# Patient Record
Sex: FEMALE | Race: WHITE | NOT HISPANIC OR LATINO | Employment: FULL TIME | URBAN - METROPOLITAN AREA
[De-identification: names, ages, dates, MRNs, and addresses within clinical notes are randomized per-mention and may not be internally consistent; named-entity substitution may affect disease eponyms.]

---

## 2023-11-14 DIAGNOSIS — Z76.0 PRESCRIPTION REFILL: ICD-10-CM

## 2023-11-14 RX ORDER — LEVOTHYROXINE SODIUM 125 UG/1
TABLET ORAL
Qty: 90 TABLET | Refills: 0 | Status: SHIPPED | OUTPATIENT
Start: 2023-11-14 | End: 2024-02-20

## 2024-01-05 ENCOUNTER — HOSPITAL ENCOUNTER (OUTPATIENT)
Dept: RADIOLOGY | Facility: EXTERNAL LOCATION | Age: 44
Discharge: HOME | End: 2024-01-05

## 2024-01-05 DIAGNOSIS — S59.901A ELBOW INJURY, RIGHT, INITIAL ENCOUNTER: ICD-10-CM

## 2024-01-21 NOTE — PROGRESS NOTES
This is a 43 year old female for FU VISIT and care gap update and arrives with ACUTE URI Sxs and NAUSEA seen AT ER 3 days ago and at that time NEG for COVID    SHE IS ONLY VAX x 2 initial NO BOOSTERS    HAS HAD THREE EPISODES of COVID over the years.    NO THOUGHTS of SELF or OTHER HARM    Has Hx DEPRESSION          [facility] ER NOTES:       ED Provider Note  Patient Name: Catalina Meyers                                                        MRN: [id number]  : 1980  SERVICE DATE: 24        History      Patient presents with:  Flu Like Symptoms        43-year-old female with history of hypothyroidism presented ER for evaluation of not feeling well for the last 3 days.  Endorses general malaise and fatigue as well as some nausea.  Reports she was constipated but this is since resolved.  He would like she is urinating frequently without dysuria.  Denies sore throat or new cough.  States she has chronic cough in the morning secondary to smoking.  She has had some shortness of breath without chest pain.  Notes her partner at bedside has had a cold recently but no other known sick contacts.  She was febrile when she arrived here but had not really noticed any fevers or chills at home.          ROS is NEG for HEADACHE, NAUSEA, VOMITING, DIARRHEA, CHEST PAIN, SOB, and BLEEDING and as further REVIEWED BELOW.    Subjective   Catalina Meyers is a 43 y.o. female who presents for FU VISIT.    HPI:    See above    Review of systems is essentially negative for all systems except for any identified issues in HPI above    Objective     /62   Pulse 89   Temp 35.7 °C (96.2 °F) (Temporal)   Wt 48.5 kg (107 lb)   SpO2 98%   BMI 17.01 kg/m²      Physical Examination:       GENERAL           General Appearance: well-appearing, well-developed, well-hydrated, well-nourished, no acute distress.        HEENT           NECK supple, no masses or thyromegaly, no carotid bruit.        EYES           Extraocular Movements: normal,  bilateral eyes EMILIA, BUT POS FOR LEFT LATERAL CONJUNCTIVAL injection        HEART           Rate and Rhythm regular rate and rhythm. Heart sounds: normal S1S2, no S3 or S4. Murmurs: none.        CHEST           Breath sounds: Clear to IPPA, RR<16 no use of accessory muscles.        ABDOMEN           General: Neg for LKKS or masses, no scleral icterus or jaundice.        MUSCULOSKELETAL           Joints Demonstration: Neg for erythema, swelling or joint deformities. gross abnormalities no gross abnormalities.        EXTREMITIES           Lower Extremities: Neg for cyanosis, clubbing or edema.       Assessment/Plan   START A 5 day HOME QUARANTINE for VIRAL ILLNESS as we seek to again RULE OUT RSV and COVID and FLU and DUE TO MARKED WEIGHT LOSS and NAUSEA and FEVER and ABDO PAIN, we are ruling out SUBACUTE abdominal/pelvic issues toay with a CT SCAN    REST  FLUIDS with PEDIALYTE  TYLENOL for aches and pains    REMAIN OFF WORK until Monday next week and schedule a FU visit with me later this week for us to work on your [employer] paperwork. Please adv your employer with this note that I have you off work from today until one week from now pending further work up and once we have completed your evaluation (imaging and labs) we will complete their requested [employer] PAPERWORK>          Problem List Items Addressed This Visit    None  Visit Diagnoses       Screening for colorectal cancer    -  Primary    Health counseling        Immunization counseling        Acute upper respiratory infection        Relevant Orders    Sars-CoV-2 PCR, Symptomatic    RSV PCR    Influenza A, and B PCR    CBC and Auto Differential    Basic metabolic panel    Urinalysis with Reflex Culture and Microscopic    Nausea        Relevant Orders    Amylase    Lipase    Hepatic function panel    Malaise        Relevant Orders    Amylase    Lipase    Hepatic function panel    Mononucleosis screen    Collins-Barr virus VCA antibody panel    Other fatigue         Relevant Orders    Mononucleosis screen    Collins-Barr virus VCA antibody panel    Screening mammogram for breast cancer        Relevant Orders    BI mammo bilateral screening tomosynthesis            FOLLOW UP:  PRN and as specified above         Meli Burks M.D.

## 2024-01-22 ENCOUNTER — OFFICE VISIT (OUTPATIENT)
Dept: PRIMARY CARE | Facility: CLINIC | Age: 44
End: 2024-01-22
Payer: COMMERCIAL

## 2024-01-22 ENCOUNTER — HOSPITAL ENCOUNTER (OUTPATIENT)
Dept: RADIOLOGY | Facility: HOSPITAL | Age: 44
Discharge: HOME | End: 2024-01-22
Payer: COMMERCIAL

## 2024-01-22 VITALS
OXYGEN SATURATION: 98 % | TEMPERATURE: 96.2 F | DIASTOLIC BLOOD PRESSURE: 62 MMHG | HEART RATE: 89 BPM | BODY MASS INDEX: 17.01 KG/M2 | SYSTOLIC BLOOD PRESSURE: 128 MMHG | WEIGHT: 107 LBS

## 2024-01-22 DIAGNOSIS — Z09 HOSPITAL DISCHARGE FOLLOW-UP: ICD-10-CM

## 2024-01-22 DIAGNOSIS — Z12.31 SCREENING MAMMOGRAM FOR BREAST CANCER: ICD-10-CM

## 2024-01-22 DIAGNOSIS — R11.0 NAUSEA: ICD-10-CM

## 2024-01-22 DIAGNOSIS — R53.81 MALAISE: ICD-10-CM

## 2024-01-22 DIAGNOSIS — R63.4 WEIGHT LOSS: ICD-10-CM

## 2024-01-22 DIAGNOSIS — R10.30 LOWER ABDOMINAL PAIN: Primary | ICD-10-CM

## 2024-01-22 DIAGNOSIS — Z71.85 IMMUNIZATION COUNSELING: ICD-10-CM

## 2024-01-22 DIAGNOSIS — R53.83 OTHER FATIGUE: ICD-10-CM

## 2024-01-22 DIAGNOSIS — Z12.11 SCREENING FOR COLORECTAL CANCER: ICD-10-CM

## 2024-01-22 DIAGNOSIS — J06.9 ACUTE UPPER RESPIRATORY INFECTION: ICD-10-CM

## 2024-01-22 DIAGNOSIS — Z12.12 SCREENING FOR COLORECTAL CANCER: ICD-10-CM

## 2024-01-22 DIAGNOSIS — Z71.9 HEALTH COUNSELING: ICD-10-CM

## 2024-01-22 DIAGNOSIS — R10.30 LOWER ABDOMINAL PAIN: ICD-10-CM

## 2024-01-22 PROCEDURE — 82248 BILIRUBIN DIRECT: CPT

## 2024-01-22 PROCEDURE — 83690 ASSAY OF LIPASE: CPT

## 2024-01-22 PROCEDURE — 87635 SARS-COV-2 COVID-19 AMP PRB: CPT | Mod: WESLAB | Performed by: FAMILY MEDICINE

## 2024-01-22 PROCEDURE — 81001 URINALYSIS AUTO W/SCOPE: CPT

## 2024-01-22 PROCEDURE — 85025 COMPLETE CBC W/AUTO DIFF WBC: CPT

## 2024-01-22 PROCEDURE — 86665 EPSTEIN-BARR CAPSID VCA: CPT

## 2024-01-22 PROCEDURE — 80053 COMPREHEN METABOLIC PANEL: CPT

## 2024-01-22 PROCEDURE — 74177 CT ABD & PELVIS W/CONTRAST: CPT

## 2024-01-22 PROCEDURE — 86308 HETEROPHILE ANTIBODY SCREEN: CPT

## 2024-01-22 PROCEDURE — 86664 EPSTEIN-BARR NUCLEAR ANTIGEN: CPT

## 2024-01-22 PROCEDURE — 99214 OFFICE O/P EST MOD 30 MIN: CPT | Mod: 25 | Performed by: FAMILY MEDICINE

## 2024-01-22 PROCEDURE — 2550000001 HC RX 255 CONTRASTS: Performed by: FAMILY MEDICINE

## 2024-01-22 PROCEDURE — 86663 EPSTEIN-BARR ANTIBODY: CPT

## 2024-01-22 PROCEDURE — 99214 OFFICE O/P EST MOD 30 MIN: CPT | Performed by: FAMILY MEDICINE

## 2024-01-22 PROCEDURE — 1036F TOBACCO NON-USER: CPT | Performed by: FAMILY MEDICINE

## 2024-01-22 PROCEDURE — 82150 ASSAY OF AMYLASE: CPT

## 2024-01-22 RX ADMIN — IOHEXOL 75 ML: 350 INJECTION, SOLUTION INTRAVENOUS at 13:41

## 2024-01-22 ASSESSMENT — PAIN SCALES - GENERAL: PAINLEVEL: 0-NO PAIN

## 2024-01-22 NOTE — PATIENT INSTRUCTIONS
Assessment/Plan   START A 5 day HOME QUARANTINE for VIRAL ILLNESS as we seek to again RULE OUT RSV and COVID and FLU and DUE TO MARKED WEIGHT LOSS and NAUSEA and FEVER and ABDO PAIN, we are ruling out SUBACUTE abdominal/pelvic issues toay with a CT SCAN    REST  FLUIDS with PEDIALYTE  TYLENOL for aches and pains    REMAIN OFF WORK until Monday next week and schedule a FU visit with me later this week for us to work on your FMLA paperwork. Please adv your employer with this note that I have you off work from today until one week from now pending further work up and once we have completed your evaluation (imaging and labs) we will complete their requested [employer]ML[employer] PAPERWORK>

## 2024-01-22 NOTE — PROGRESS NOTES
This is a 43 year old female for FU visit for review of LABS and CT done for weight loss issues with OVARIAN CYSTS and scheduled to see Dr Cutler soon.      ALSO INITIAL EBV NEG but further testing is POS: and has HX MONO 2013  ALSO WISHES to resume work when she is ready and will TAKE 2 more weeks off to see SPECIALIST and have further work up    ALSO FOR OFF WORK total one month Jan 16th thru Feb 16th to complete SPECIALIST visits and REST and resolve VIRAL ILLNESS       Result Notes   important suggestion  Newer results are available. Click to view them now.          Component  Ref Range & Units 4 d ago 4 yr ago   EBV VCA, IgG  Negative Positive Abnormal     EBV VCA, IgM     Comment: Due to reagent shortage, the EBV VCA IgM test has been temporarily sent out to [email or web address].   EBV Early Antigen Antibody, IgG  Negative Negative    EBV Nuclear Antigen Antibody, IgG  Negative Positive Abnormal      >8.0  Unit: AI    AI VALUES ARE INTERPRETED AS FOLLOWS:  NEGATIVE SPECIMENS <=0.8  EQUIVOCAL SPECIMENS 0.9 TO 1.0  POSITIVE SPECIMENS >=1.1 Antibody index(AI) values reflect   qualitative changes in antibody concentration that cannot   be associated with clinical condition or disease state. R      Resulting Agency [facility]              Narrative  Performed by: [facility]  EBV INTERPRETATION CHART    PRIMARY ACUTE  VCA-IGG: +/-  VCA-IGM: +/-  NA-IGG: -  EA-IGG: +/-    LATE ACUTE  VCA-IGG: +  VCA-IGM: +/-  NA-IGG: +/-  EA-IGG: +/-    RECOVERING  VCA-IGG: +  VCA-IGM: -  NA-IGG: -  EA-IGG: +    PREVIOUS INFECTION  VCA-IGG: +  VCA-IGM: -  NA-IGG: +/-  EA-IGG: -      Specimen Collected: 01/22/24 12:58 Last Resulted: 01/22/24 21:41        Lab Flowsheet        Order Details        View Encounter        Lab and Collection Details        Routing        Result History     View All Conversations on this Encounter        R=Reference range differs from displayed range             DOES ADMIT to VAPING and will DO CHEST CT as  well     CT ABDO/PELVIS         IMPRESSION:  1. No acute abdominopelvic pathology.  2. Bilateral adnexal cysts measuring up to 1.6 cm.      MACRO:  None      Signed by: Felecia Austin 1/22/2024 2:07 PM  Dictation workstation:   [id number]      ROS is NEG for HEADACHE, NAUSEA, VOMITING, DIARRHEA, CHEST PAIN, SOB, and BLEEDING and as further REVIEWED BELOW.Subjective   Catalina Meyers is a 43 y.o. female who presents for FU visit.    HPI:    See above    Review of systems is essentially negative for all systems except for any identified issues in HPI above.    Objective     There were no vitals taken for this visit.     Physical Examination:       GENERAL           General Appearance: well-appearing, well-developed, well-hydrated, well-nourished, no acute distress.        HEENT           NECK supple, no masses or thyromegaly, no carotid bruit.        EYES           Extraocular Movements: normal, bilateral eyes EMILIA, no conjunctival injection.        HEART           Rate and Rhythm regular rate and rhythm. Heart sounds: normal S1S2, no S3 or S4. Murmurs: none.        CHEST           Breath sounds: Clear to IPPA, RR<16 no use of accessory muscles.        ABDOMEN           General: Neg for LKKS or masses, no scleral icterus or jaundice.        MUSCULOSKELETAL           Joints Demonstration: Neg for erythema, swelling or joint deformities. gross abnormalities no gross abnormalities.        EXTREMITIES           Lower Extremities: Neg for cyanosis, clubbing or edema.       Assessment/Plan     OFF WORK from Jan 16th until Feb 16th to enable specialty evaluation of ABN CT SCAN and also to FU with PCP Dr. Burks approx Feb 14th.    Problem List Items Addressed This Visit    None      FOLLOW UP:  PRN and as specified above         Meli Burks M.D.

## 2024-01-23 LAB — SARS-COV-2 RNA RESP QL NAA+PROBE: NOT DETECTED

## 2024-01-24 ENCOUNTER — HOSPITAL ENCOUNTER (OUTPATIENT)
Dept: RADIOLOGY | Facility: CLINIC | Age: 44
Discharge: HOME | End: 2024-01-24
Payer: COMMERCIAL

## 2024-01-24 VITALS — HEIGHT: 66 IN | WEIGHT: 107 LBS | BODY MASS INDEX: 17.19 KG/M2

## 2024-01-24 DIAGNOSIS — Z12.31 SCREENING MAMMOGRAM FOR BREAST CANCER: ICD-10-CM

## 2024-01-24 PROCEDURE — 77067 SCR MAMMO BI INCL CAD: CPT

## 2024-01-26 ENCOUNTER — OFFICE VISIT (OUTPATIENT)
Dept: PRIMARY CARE | Facility: CLINIC | Age: 44
End: 2024-01-26
Payer: COMMERCIAL

## 2024-01-26 VITALS
DIASTOLIC BLOOD PRESSURE: 78 MMHG | WEIGHT: 106.4 LBS | HEART RATE: 77 BPM | HEIGHT: 66 IN | TEMPERATURE: 97.7 F | OXYGEN SATURATION: 100 % | BODY MASS INDEX: 17.1 KG/M2 | SYSTOLIC BLOOD PRESSURE: 132 MMHG

## 2024-01-26 DIAGNOSIS — B27.00 EBV (EPSTEIN-BARR VIRUS) VIREMIA: ICD-10-CM

## 2024-01-26 DIAGNOSIS — R63.4 WEIGHT LOSS: ICD-10-CM

## 2024-01-26 DIAGNOSIS — B34.9 VIRAL ILLNESS: ICD-10-CM

## 2024-01-26 DIAGNOSIS — R73.09 ELEVATED GLUCOSE: ICD-10-CM

## 2024-01-26 DIAGNOSIS — Z71.85 IMMUNIZATION COUNSELING: ICD-10-CM

## 2024-01-26 DIAGNOSIS — U07.0 VAPING-RELATED DISORDER: ICD-10-CM

## 2024-01-26 DIAGNOSIS — Z71.9 HEALTH COUNSELING: ICD-10-CM

## 2024-01-26 DIAGNOSIS — R53.83 OTHER FATIGUE: Primary | ICD-10-CM

## 2024-01-26 DIAGNOSIS — R53.81 MALAISE: ICD-10-CM

## 2024-01-26 DIAGNOSIS — R10.30 LOWER ABDOMINAL PAIN: ICD-10-CM

## 2024-01-26 PROBLEM — E03.9 HYPOTHYROIDISM: Status: ACTIVE | Noted: 2024-01-26

## 2024-01-26 PROBLEM — K92.1 HEMATOCHEZIA: Status: ACTIVE | Noted: 2024-01-26

## 2024-01-26 PROBLEM — F41.1 GENERALIZED ANXIETY DISORDER: Status: ACTIVE | Noted: 2024-01-26

## 2024-01-26 PROBLEM — M51.37 DEGENERATION OF INTERVERTEBRAL DISC OF LUMBOSACRAL REGION: Status: ACTIVE | Noted: 2024-01-26

## 2024-01-26 PROBLEM — F32.A MILD DEPRESSION: Status: ACTIVE | Noted: 2024-01-26

## 2024-01-26 PROBLEM — M51.379 DEGENERATION OF INTERVERTEBRAL DISC OF LUMBOSACRAL REGION: Status: ACTIVE | Noted: 2024-01-26

## 2024-01-26 PROBLEM — K21.9 GASTROESOPHAGEAL REFLUX DISEASE: Status: ACTIVE | Noted: 2024-01-26

## 2024-01-26 LAB — POC HEMOGLOBIN A1C: 5.4 % (ref 4.2–6.5)

## 2024-01-26 PROCEDURE — 99214 OFFICE O/P EST MOD 30 MIN: CPT | Performed by: FAMILY MEDICINE

## 2024-01-26 PROCEDURE — 1036F TOBACCO NON-USER: CPT | Performed by: FAMILY MEDICINE

## 2024-01-26 RX ORDER — ESCITALOPRAM OXALATE 10 MG/1
TABLET ORAL
COMMUNITY
End: 2024-02-13 | Stop reason: ALTCHOICE

## 2024-01-26 RX ORDER — HYDROXYZINE HYDROCHLORIDE 25 MG/1
TABLET, FILM COATED ORAL EVERY 8 HOURS
COMMUNITY

## 2024-01-26 RX ORDER — FLUTICASONE PROPIONATE 50 MCG
1 SPRAY, SUSPENSION (ML) NASAL DAILY
COMMUNITY
Start: 2023-12-13 | End: 2024-02-13 | Stop reason: ALTCHOICE

## 2024-01-26 ASSESSMENT — ENCOUNTER SYMPTOMS
NAUSEA: 1
WEIGHT LOSS: 1
ARTHRALGIAS: 0
ABDOMINAL PAIN: 1
HEADACHES: 0
HEMATOCHEZIA: 0
FEVER: 0
MYALGIAS: 0
DIARRHEA: 0
FREQUENCY: 0
ANOREXIA: 0
HEMATURIA: 0
VOMITING: 0
DYSURIA: 0
FLATUS: 1
BELCHING: 1
CONSTIPATION: 0

## 2024-01-26 ASSESSMENT — PATIENT HEALTH QUESTIONNAIRE - PHQ9
SUM OF ALL RESPONSES TO PHQ9 QUESTIONS 1 AND 2: 0
1. LITTLE INTEREST OR PLEASURE IN DOING THINGS: NOT AT ALL
2. FEELING DOWN, DEPRESSED OR HOPELESS: NOT AT ALL

## 2024-01-26 ASSESSMENT — PAIN SCALES - GENERAL: PAINLEVEL: 2

## 2024-02-02 NOTE — PROGRESS NOTES
This is a 43 year old female for FU visit and while initial screen EBV was neg final report POS as copied below:    PROBABLE late acute MONO like EBV illness:  She and I agree she may have had a RECENT UNDOCUMENTED COVID like ILLNESS as well.    Currently much improved and is ready to resume work FEELING WELL and is URGED TO DC VAPING and SMOKING and aware of risks but not yet ready--set up FU visit to assist her for SMOKING CESSATION    Occasional marijuana and no signif ETOH            Specimen Collected: 01/22/24 12:58 Last Resulted: 01/22/24 22:01        Lab Flowsheet        Order Details        View Encounter        Lab and Collection Details        Routing        Result History     View All Conversations on this Encounter        R=Reference range differs from displayed range        Result Care Coordination      Result Notes     Gabriela Mcgowan MA  1/25/2024  8:28 AM EST Back to Top      [employer] message sent.    Meli Ramos MD  1/25/2024  7:44 AM EST       eBV is neg    Jayla Thornton MA  1/23/2024  9:36 AM EST       informed    Jayla Thornton MA  1/23/2024  9:35 AM EST       Informed and appreciated    Meli Ramos MD  1/23/2024  7:48 AM EST       COVID NEG    Meli Ramos MD  1/23/2024  7:46 AM EST       UA shows signficant PROTEINURIA and may need referral to NEPHROLOGIST.  Please adv we will enter the referral for her now and repeat in near future.   EBV panel is POS  indicating past MONO like illness--current MONO SPOT negative but EBV more accurate;; other variable whether recent past and or remote so may have current sxs as a result of late acute MONO like illness. LFTS wnl OAR and BMP wnl  AMYLASE and LIPASE and CBC wnl OAR.               Contains abnormal data Collins-Barr virus VCA antibody panel  Order: [id number]  Status: Final result       Visible to patient: Yes (seen)       Dx: Malaise; Other fatigue    6 Result Notes   important suggestion  Newer results are  available. Click to view them now.          Component  Ref Range & Units 11 d ago 4 yr ago   EBV VCA, IgG  Negative Positive Abnormal     EBV VCA, IgM     Comment: Due to reagent shortage, the EBV VCA IgM test has been temporarily sent out to [facility].   EBV Early Antigen Antibody, IgG  Negative Negative    EBV Nuclear Antigen Antibody, IgG  Negative Positive Abnormal      >8.0  Unit: AI    AI VALUES ARE INTERPRETED AS FOLLOWS:  NEGATIVE SPECIMENS <=0.8  EQUIVOCAL SPECIMENS 0.9 TO 1.0  POSITIVE SPECIMENS >=1.1 Antibody index(AI) values reflect   qualitative changes in antibody concentration that cannot   be associated with clinical condition or disease state. R      Resulting Agency [facility]              Narrative  Performed by: [facility]  EBV INTERPRETATION CHART    PRIMARY ACUTE  VCA-IGG: +/-  VCA-IGM: +/-  NA-IGG: -  EA-IGG: +/-    LATE ACUTE  VCA-IGG: +  VCA-IGM: +/-  NA-IGG: +/-  EA-IGG: +/-    RECOVERING  VCA-IGG: +  VCA-IGM: -  NA-IGG: -  EA-IGG: +    PREVIOUS INFECTION  VCA-IGG: +  VCA-IGM: -  NA-IGG: +/-               ROS is NEG for HEADACHE, NAUSEA, VOMITING, DIARRHEA, CHEST PAIN, SOB, and BLEEDING and as further REVIEWED BELOW.    Subjective   Catalina Meyers is a 43 y.o. female who presents for No chief complaint on file..    HPI:    See above FU for FATIGUE and intial EBV neg and subsequent lab analysis shows POS two factors as above    Review of systems is essentially negative for all systems except for any identified issues in HPI above.    Objective     There were no vitals taken for this visit.     Physical Examination:       GENERAL           General Appearance: well-appearing, well-developed, well-hydrated, well-nourished, no acute distress.        HEENT           NECK supple, no masses or thyromegaly, no carotid bruit.        EYES           Extraocular Movements: normal, bilateral eyes EMILIA, no conjunctival injection.        HEART           Rate and Rhythm regular rate and rhythm.  Heart sounds: normal S1S2, no S3 or S4. Murmurs: none.        CHEST           Breath sounds: Clear to IPPA, RR<16 no use of accessory muscles.        ABDOMEN           General: Neg for LKKS or masses, no scleral icterus or jaundice.        MUSCULOSKELETAL           Joints Demonstration: Neg for erythema, swelling or joint deformities. gross abnormalities no gross abnormalities.        EXTREMITIES           Lower Extremities: Neg for cyanosis, clubbing or edema.       Assessment/Plan     EBV ILLNESS resolved  OVARIAN CYST already opined upon by G[facility] at [facility] and no worrisome issues    Medically READY TO RESUME work as of next week Monday, February 20th, 2024    Problem List Items Addressed This Visit    None      FOLLOW UP:  PRN and as specified above         Meli Burks M.D.

## 2024-02-13 ENCOUNTER — OFFICE VISIT (OUTPATIENT)
Dept: OBSTETRICS AND GYNECOLOGY | Facility: CLINIC | Age: 44
End: 2024-02-13
Payer: COMMERCIAL

## 2024-02-13 VITALS
WEIGHT: 111 LBS | BODY MASS INDEX: 17.84 KG/M2 | DIASTOLIC BLOOD PRESSURE: 78 MMHG | HEIGHT: 66 IN | SYSTOLIC BLOOD PRESSURE: 124 MMHG

## 2024-02-13 DIAGNOSIS — N83.201 BILATERAL OVARIAN CYSTS: Primary | ICD-10-CM

## 2024-02-13 DIAGNOSIS — N83.202 BILATERAL OVARIAN CYSTS: Primary | ICD-10-CM

## 2024-02-13 PROCEDURE — 99204 OFFICE O/P NEW MOD 45 MIN: CPT | Performed by: OBSTETRICS & GYNECOLOGY

## 2024-02-13 PROCEDURE — 1036F TOBACCO NON-USER: CPT | Performed by: OBSTETRICS & GYNECOLOGY

## 2024-02-13 NOTE — PROGRESS NOTES
Subjective   Patient ID: Catalina Meyers is a 43 y.o. female who presents for Follow-up.   abdomen pelvis w IV contrast  Status: Final result    Study Result    Narrative & Impression  Interpreted By:  Felecia Austin,   STUDY:  CT ABDOMEN PELVIS W IV CONTRAST; 1/22/2024 1:42 pm      INDICATION:  Signs/Symptoms:ABDO PAIN and PELVIC PAIN int FEVER and weight loss  and NAUSEA and 7/10 pain on FRIDAY now 4/10;      COMPARISON:  None      ACCESSION NUMBER(S):  [id number]      ORDERING CLINICIAN:  SONG STEWART      TECHNIQUE:  Contiguous axial images of the abdomen/pelvis were performed with IV  contrast. 75 ml of Omnipaque 350 was utilized. All CT examinations  are performed with 1 or more of the following dose reduction  techniques: Automated exposure control, adjustment of mA and/or kv  according to patient's size, or use of iterative reconstruction  techniques.      FINDINGS:  The liver, gallbladder,  common bile duct, pancreas, spleen, and  adrenal glands are unremarkable.      The kidneys enhance symmetrically.  No urolithiasis is seen. No  hydroureteronephrosis is seen.      The visualized aorta is unremarkable.      The small bowel is not dilated. The appendix is normal.      No free intraperitoneal air or fluid is seen.      The bladder is decompressed. Bilateral adnexal cysts are seen  measuring up to 1.6 cm on the right and 1.2 cm on left. The left  adnexal cyst could represent a corpus luteal cyst.      The visualized osseous structures are intact.      Limited images of the lower thorax are unremarkable.      IMPRESSION:  1. No acute abdominopelvic pathology.  2. Bilateral adnexal cysts measuring up to 1.6 cm.      MACRO:  None      Signed by: Felecia Austin 1/22/2024 2:07 PM  Dictation workstation:   [id number]    Patient not seen since 2019.  3 children.  Prior vaginal hysterectomy she does vape    Current meds include Levoxyl and an anxiety med    Allergic to Keflex and Bactrim    Has not been  feeling well been pretty sick for at least the last month battling mononucleosis.  As part of her workup she had a CT scan I reviewed the CT scan from January 22.  Shows a 1.6 cm right ovarian cyst and 1.2 cm left ovarian cyst    He reviewed different types of cyst including functional physiologic benign and malignant.  At this size these would not be causing any symptoms or functional and no further treatment is needed.  I will give her an information pamphlet on ovarian cysts.  CT scan and emergency room results reviewed        Review of Systems    Objective   Physical Exam    Assessment/Plan            Jose Cutler MD 02/13/24 2:28 PM

## 2024-02-14 ENCOUNTER — OFFICE VISIT (OUTPATIENT)
Dept: PRIMARY CARE | Facility: CLINIC | Age: 44
End: 2024-02-14
Payer: COMMERCIAL

## 2024-02-14 VITALS
WEIGHT: 111 LBS | HEART RATE: 57 BPM | BODY MASS INDEX: 17.92 KG/M2 | OXYGEN SATURATION: 100 % | TEMPERATURE: 98.9 F | SYSTOLIC BLOOD PRESSURE: 134 MMHG | DIASTOLIC BLOOD PRESSURE: 78 MMHG

## 2024-02-14 DIAGNOSIS — R53.83 OTHER FATIGUE: Primary | ICD-10-CM

## 2024-02-14 DIAGNOSIS — R73.09 ELEVATED GLUCOSE: ICD-10-CM

## 2024-02-14 DIAGNOSIS — R63.4 WEIGHT LOSS: ICD-10-CM

## 2024-02-14 DIAGNOSIS — R80.9 PROTEINURIA, UNSPECIFIED TYPE: ICD-10-CM

## 2024-02-14 DIAGNOSIS — B34.9 VIRAL ILLNESS: ICD-10-CM

## 2024-02-14 DIAGNOSIS — U07.0 VAPING-RELATED DISORDER: ICD-10-CM

## 2024-02-14 DIAGNOSIS — B27.00 EBV (EPSTEIN-BARR VIRUS) VIREMIA: ICD-10-CM

## 2024-02-14 DIAGNOSIS — R10.30 LOWER ABDOMINAL PAIN: ICD-10-CM

## 2024-02-14 PROCEDURE — 1036F TOBACCO NON-USER: CPT | Performed by: FAMILY MEDICINE

## 2024-02-14 PROCEDURE — 99214 OFFICE O/P EST MOD 30 MIN: CPT | Performed by: FAMILY MEDICINE

## 2024-02-14 ASSESSMENT — PATIENT HEALTH QUESTIONNAIRE - PHQ9
3. TROUBLE FALLING OR STAYING ASLEEP OR SLEEPING TOO MUCH: MORE THAN HALF THE DAYS
1. LITTLE INTEREST OR PLEASURE IN DOING THINGS: NEARLY EVERY DAY
6. FEELING BAD ABOUT YOURSELF - OR THAT YOU ARE A FAILURE OR HAVE LET YOURSELF OR YOUR FAMILY DOWN: NOT AT ALL
10. IF YOU CHECKED OFF ANY PROBLEMS, HOW DIFFICULT HAVE THESE PROBLEMS MADE IT FOR YOU TO DO YOUR WORK, TAKE CARE OF THINGS AT HOME, OR GET ALONG WITH OTHER PEOPLE: NOT DIFFICULT AT ALL
SUM OF ALL RESPONSES TO PHQ9 QUESTIONS 1 AND 2: 4
4. FEELING TIRED OR HAVING LITTLE ENERGY: SEVERAL DAYS
SUM OF ALL RESPONSES TO PHQ QUESTIONS 1-9: 8
9. THOUGHTS THAT YOU WOULD BE BETTER OFF DEAD, OR OF HURTING YOURSELF: NOT AT ALL
2. FEELING DOWN, DEPRESSED OR HOPELESS: SEVERAL DAYS
5. POOR APPETITE OR OVEREATING: NOT AT ALL
8. MOVING OR SPEAKING SO SLOWLY THAT OTHER PEOPLE COULD HAVE NOTICED. OR THE OPPOSITE, BEING SO FIGETY OR RESTLESS THAT YOU HAVE BEEN MOVING AROUND A LOT MORE THAN USUAL: SEVERAL DAYS
7. TROUBLE CONCENTRATING ON THINGS, SUCH AS READING THE NEWSPAPER OR WATCHING TELEVISION: NOT AT ALL

## 2024-02-14 ASSESSMENT — ENCOUNTER SYMPTOMS
LOSS OF SENSATION IN FEET: 0
DEPRESSION: 0
OCCASIONAL FEELINGS OF UNSTEADINESS: 0

## 2024-02-14 ASSESSMENT — PAIN SCALES - GENERAL: PAINLEVEL: 0-NO PAIN

## 2024-02-20 DIAGNOSIS — Z76.0 PRESCRIPTION REFILL: ICD-10-CM

## 2024-02-20 RX ORDER — LEVOTHYROXINE SODIUM 125 UG/1
125 TABLET ORAL
Qty: 90 TABLET | Refills: 0 | Status: SHIPPED | OUTPATIENT
Start: 2024-02-20 | End: 2024-05-22

## 2024-02-24 ENCOUNTER — TELEPHONE (OUTPATIENT)
Dept: PRIMARY CARE | Facility: CLINIC | Age: 44
End: 2024-02-24
Payer: COMMERCIAL

## 2024-02-26 ENCOUNTER — TELEPHONE (OUTPATIENT)
Dept: PRIMARY CARE | Facility: CLINIC | Age: 44
End: 2024-02-26
Payer: COMMERCIAL

## 2024-02-26 NOTE — PROGRESS NOTES
This is a 43 year old female for TELE visit for EVAL of FEVER and MALAISE and WEAKNESS and heart palpitations and states she has been sick x a couple of days and was NOT TESTED for COVID.  Did speak to ON CALL PHYSICIAN Dr Mcleod over the weekend.    ILLNESS started LAST WEEK  and MISSED WORK last week.    Has only TWO COVID initial IMM    She does HAVE SOB and HEART PALPITATIONS and is therefore asked to PRESENT TO ER of choice for work up   SHE IS adv that I am concerned about SOB and PALPITATIONS and ANXIETY  in context of FEVER and needs to have FULL WORK UP for these conditions as it could be a COMPLEX COVID condition affecting her heart/lungs, etc and prone to PE during COVID as well    Has not been to work since Wednesday last week         SHE agrees to PRESENT TO ER likely will report to [facility]        ER IS NOTIFIED of her impending arrival

## 2024-02-26 NOTE — TELEPHONE ENCOUNTER
Pt states that she spoke with on call doctor due to getting sick again. Her temp has been 101-102 . She has been taking tylenol. She states all her symptoms  are the same as when she had MONO but she is more weak now than she has been. Wants to know what to do. Please schedule for a virtual with pcp

## 2024-02-27 ENCOUNTER — APPOINTMENT (OUTPATIENT)
Dept: CARDIOLOGY | Facility: HOSPITAL | Age: 44
End: 2024-02-27
Payer: COMMERCIAL

## 2024-02-27 ENCOUNTER — TELEMEDICINE (OUTPATIENT)
Dept: PRIMARY CARE | Facility: CLINIC | Age: 44
End: 2024-02-27
Payer: COMMERCIAL

## 2024-02-27 ENCOUNTER — HOSPITAL ENCOUNTER (EMERGENCY)
Facility: HOSPITAL | Age: 44
Discharge: HOME | End: 2024-02-27
Attending: STUDENT IN AN ORGANIZED HEALTH CARE EDUCATION/TRAINING PROGRAM
Payer: COMMERCIAL

## 2024-02-27 ENCOUNTER — APPOINTMENT (OUTPATIENT)
Dept: RADIOLOGY | Facility: HOSPITAL | Age: 44
End: 2024-02-27
Payer: COMMERCIAL

## 2024-02-27 VITALS
SYSTOLIC BLOOD PRESSURE: 129 MMHG | DIASTOLIC BLOOD PRESSURE: 90 MMHG | HEIGHT: 66 IN | WEIGHT: 103.4 LBS | RESPIRATION RATE: 18 BRPM | TEMPERATURE: 99.4 F | BODY MASS INDEX: 16.62 KG/M2 | HEART RATE: 93 BPM | OXYGEN SATURATION: 97 %

## 2024-02-27 DIAGNOSIS — R53.1 WEAKNESS: ICD-10-CM

## 2024-02-27 DIAGNOSIS — R53.83 OTHER FATIGUE: ICD-10-CM

## 2024-02-27 DIAGNOSIS — Z71.85 IMMUNIZATION COUNSELING: ICD-10-CM

## 2024-02-27 DIAGNOSIS — Z71.9 HEALTH COUNSELING: ICD-10-CM

## 2024-02-27 DIAGNOSIS — R50.9 FEVER, UNSPECIFIED FEVER CAUSE: Primary | ICD-10-CM

## 2024-02-27 DIAGNOSIS — R68.89 FLU-LIKE SYMPTOMS: Primary | ICD-10-CM

## 2024-02-27 LAB
ALBUMIN SERPL-MCNC: 4.2 G/DL (ref 3.5–5)
ALP BLD-CCNC: 51 U/L (ref 35–125)
ALT SERPL-CCNC: 7 U/L (ref 5–40)
ANION GAP SERPL CALC-SCNC: 8 MMOL/L
APPEARANCE UR: CLEAR
AST SERPL-CCNC: 13 U/L (ref 5–40)
ATRIAL RATE: 67 BPM
BASOPHILS # BLD AUTO: 0.03 X10*3/UL (ref 0–0.1)
BASOPHILS NFR BLD AUTO: 1 %
BILIRUB SERPL-MCNC: 0.5 MG/DL (ref 0.1–1.2)
BILIRUB UR STRIP.AUTO-MCNC: NEGATIVE MG/DL
BUN SERPL-MCNC: 8 MG/DL (ref 8–25)
CALCIUM SERPL-MCNC: 8.8 MG/DL (ref 8.5–10.4)
CHLORIDE SERPL-SCNC: 105 MMOL/L (ref 97–107)
CO2 SERPL-SCNC: 27 MMOL/L (ref 24–31)
COLOR UR: ABNORMAL
CREAT SERPL-MCNC: 0.6 MG/DL (ref 0.4–1.6)
D DIMER PPP FEU-MCNC: 0.24 MG/L FEU (ref 0.19–0.5)
EGFRCR SERPLBLD CKD-EPI 2021: >90 ML/MIN/1.73M*2
EOSINOPHIL # BLD AUTO: 0.1 X10*3/UL (ref 0–0.7)
EOSINOPHIL NFR BLD AUTO: 3.2 %
ERYTHROCYTE [DISTWIDTH] IN BLOOD BY AUTOMATED COUNT: 12.4 % (ref 11.5–14.5)
FLUAV RNA RESP QL NAA+PROBE: NOT DETECTED
FLUBV RNA RESP QL NAA+PROBE: NOT DETECTED
GLUCOSE SERPL-MCNC: 107 MG/DL (ref 65–99)
GLUCOSE UR STRIP.AUTO-MCNC: NORMAL MG/DL
HCT VFR BLD AUTO: 38.1 % (ref 36–46)
HGB BLD-MCNC: 12.9 G/DL (ref 12–16)
IMM GRANULOCYTES # BLD AUTO: 0 X10*3/UL (ref 0–0.7)
IMM GRANULOCYTES NFR BLD AUTO: 0 % (ref 0–0.9)
KETONES UR STRIP.AUTO-MCNC: NEGATIVE MG/DL
LEUKOCYTE ESTERASE UR QL STRIP.AUTO: NEGATIVE
LYMPHOCYTES # BLD AUTO: 1.3 X10*3/UL (ref 1.2–4.8)
LYMPHOCYTES NFR BLD AUTO: 41.9 %
MCH RBC QN AUTO: 30.7 PG (ref 26–34)
MCHC RBC AUTO-ENTMCNC: 33.9 G/DL (ref 32–36)
MCV RBC AUTO: 91 FL (ref 80–100)
MONOCYTES # BLD AUTO: 0.27 X10*3/UL (ref 0.1–1)
MONOCYTES NFR BLD AUTO: 8.7 %
NEUTROPHILS # BLD AUTO: 1.4 X10*3/UL (ref 1.2–7.7)
NEUTROPHILS NFR BLD AUTO: 45.2 %
NITRITE UR QL STRIP.AUTO: NEGATIVE
NRBC BLD-RTO: 0 /100 WBCS (ref 0–0)
P AXIS: 85 DEGREES
P OFFSET: 190 MS
P ONSET: 140 MS
PH UR STRIP.AUTO: 7.5 [PH]
PLATELET # BLD AUTO: 203 X10*3/UL (ref 150–450)
POTASSIUM SERPL-SCNC: 3.5 MMOL/L (ref 3.4–5.1)
PR INTERVAL: 166 MS
PROT SERPL-MCNC: 6.4 G/DL (ref 5.9–7.9)
PROT UR STRIP.AUTO-MCNC: NEGATIVE MG/DL
Q ONSET: 223 MS
QRS COUNT: 11 BEATS
QRS DURATION: 98 MS
QT INTERVAL: 406 MS
QTC CALCULATION(BAZETT): 429 MS
QTC FREDERICIA: 421 MS
R AXIS: 84 DEGREES
RBC # BLD AUTO: 4.2 X10*6/UL (ref 4–5.2)
RBC # UR STRIP.AUTO: NEGATIVE /UL
RSV RNA RESP QL NAA+PROBE: NOT DETECTED
SARS-COV-2 RNA RESP QL NAA+PROBE: NOT DETECTED
SODIUM SERPL-SCNC: 140 MMOL/L (ref 133–145)
SP GR UR STRIP.AUTO: 1.02
T AXIS: 62 DEGREES
T OFFSET: 426 MS
UROBILINOGEN UR STRIP.AUTO-MCNC: ABNORMAL MG/DL
VENTRICULAR RATE: 67 BPM
WBC # BLD AUTO: 3.1 X10*3/UL (ref 4.4–11.3)

## 2024-02-27 PROCEDURE — 96361 HYDRATE IV INFUSION ADD-ON: CPT

## 2024-02-27 PROCEDURE — 87637 SARSCOV2&INF A&B&RSV AMP PRB: CPT | Performed by: STUDENT IN AN ORGANIZED HEALTH CARE EDUCATION/TRAINING PROGRAM

## 2024-02-27 PROCEDURE — 80053 COMPREHEN METABOLIC PANEL: CPT | Performed by: STUDENT IN AN ORGANIZED HEALTH CARE EDUCATION/TRAINING PROGRAM

## 2024-02-27 PROCEDURE — 1036F TOBACCO NON-USER: CPT | Performed by: FAMILY MEDICINE

## 2024-02-27 PROCEDURE — 36415 COLL VENOUS BLD VENIPUNCTURE: CPT | Performed by: STUDENT IN AN ORGANIZED HEALTH CARE EDUCATION/TRAINING PROGRAM

## 2024-02-27 PROCEDURE — 93005 ELECTROCARDIOGRAM TRACING: CPT

## 2024-02-27 PROCEDURE — 81003 URINALYSIS AUTO W/O SCOPE: CPT | Performed by: STUDENT IN AN ORGANIZED HEALTH CARE EDUCATION/TRAINING PROGRAM

## 2024-02-27 PROCEDURE — 85300 ANTITHROMBIN III ACTIVITY: CPT | Performed by: STUDENT IN AN ORGANIZED HEALTH CARE EDUCATION/TRAINING PROGRAM

## 2024-02-27 PROCEDURE — 85025 COMPLETE CBC W/AUTO DIFF WBC: CPT | Performed by: STUDENT IN AN ORGANIZED HEALTH CARE EDUCATION/TRAINING PROGRAM

## 2024-02-27 PROCEDURE — 99442 PR PHYS/QHP TELEPHONE EVALUATION 11-20 MIN: CPT | Performed by: FAMILY MEDICINE

## 2024-02-27 PROCEDURE — 71045 X-RAY EXAM CHEST 1 VIEW: CPT

## 2024-02-27 PROCEDURE — 99283 EMERGENCY DEPT VISIT LOW MDM: CPT | Mod: 25

## 2024-02-27 PROCEDURE — 2500000004 HC RX 250 GENERAL PHARMACY W/ HCPCS (ALT 636 FOR OP/ED): Performed by: STUDENT IN AN ORGANIZED HEALTH CARE EDUCATION/TRAINING PROGRAM

## 2024-02-27 PROCEDURE — 96360 HYDRATION IV INFUSION INIT: CPT

## 2024-02-27 RX ADMIN — SODIUM CHLORIDE 1000 ML: 900 INJECTION, SOLUTION INTRAVENOUS at 10:09

## 2024-02-27 ASSESSMENT — PATIENT HEALTH QUESTIONNAIRE - PHQ9
2. FEELING DOWN, DEPRESSED OR HOPELESS: NOT AT ALL
1. LITTLE INTEREST OR PLEASURE IN DOING THINGS: NOT AT ALL
SUM OF ALL RESPONSES TO PHQ9 QUESTIONS 1 AND 2: 0

## 2024-02-27 ASSESSMENT — PAIN SCALES - GENERAL: PAINLEVEL_OUTOF10: 3

## 2024-02-27 ASSESSMENT — PAIN - FUNCTIONAL ASSESSMENT: PAIN_FUNCTIONAL_ASSESSMENT: 0-10

## 2024-02-27 ASSESSMENT — COLUMBIA-SUICIDE SEVERITY RATING SCALE - C-SSRS
6. HAVE YOU EVER DONE ANYTHING, STARTED TO DO ANYTHING, OR PREPARED TO DO ANYTHING TO END YOUR LIFE?: NO
1. IN THE PAST MONTH, HAVE YOU WISHED YOU WERE DEAD OR WISHED YOU COULD GO TO SLEEP AND NOT WAKE UP?: NO
2. HAVE YOU ACTUALLY HAD ANY THOUGHTS OF KILLING YOURSELF?: NO

## 2024-02-27 ASSESSMENT — PAIN DESCRIPTION - LOCATION: LOCATION: OTHER (COMMENT)

## 2024-02-27 NOTE — ED PROVIDER NOTES
HPI   Chief Complaint   Patient presents with    Rapid Heart Rate     Patient has had mono for the past month. Reports increased heart rate with palpitations and sob. Sent over by GP.       This is a 43-year-old female presenting to the emergency department by her primary care physician for further evaluation of multiple complaints.  She was sent reports that she was recently diagnosed about a month ago with mono, she had fevers, general fatigue and weakness, sore throat, nasal congestion with this.  She denies any chest pain but states that sometimes she feels like she gets out of breath.  It has been about a month since he was diagnosed and she is still having persistent symptoms.  She is still having intermittent fevers up to 101-102 Fahrenheit.  Her PCP did speak with me on the phone and was concern for pulmonary embolism, pneumonia or other acute infection.      History provided by:  Patient   used: No                        No data recorded                   Patient History   History reviewed. No pertinent past medical history.  Past Surgical History:   Procedure Laterality Date    HYSTERECTOMY      MR HEAD ANGIO WO IV CONTRAST  06/28/2022    MR HEAD ANGIO WO IV CONTRAST [facility]     Family History   Problem Relation Name Age of Onset    Breast cancer Mother      Breast cancer Maternal Grandmother       Social History     Tobacco Use    Smoking status: Never    Smokeless tobacco: Never   Vaping Use    Vaping Use: Every day   Substance Use Topics    Alcohol use: Never    Drug use: Yes     Types: Marijuana       Physical Exam   ED Triage Vitals   Temperature Heart Rate Respirations BP   02/27/24 0903 02/27/24 0903 02/27/24 0903 02/27/24 0903   37.4 °C (99.4 °F) 93 18 129/90      Pulse Ox Temp Source Heart Rate Source Patient Position   02/27/24 0906 02/27/24 0903 02/27/24 0903 02/27/24 0903   97 % Temporal Monitor Sitting      BP Location FiO2 (%)     02/27/24 0903 --     Left arm         Physical Exam  General: well developed, thin adult female who is awake and alert, in no apparent distress.  Family at bedside.  Eyes: sclera clear bilaterally, PERRL, EOMI  HENT: normocephalic, atraumatic. Pharynx without erythema or exudates, uvula midline.  CV: regular rate and rhythm, no murmur, no gallops, or rubs.   Resp: clear to ascultation bilaterally, no wheezes, rales, or rhonchi  GI: abdomen soft, nontender without rigidity or guarding, no peritoneal signs, abdomen is nondistended, no masses palpated  MSK: strength +5/5 to upper and lower extremities bilaterally, no swelling of the extremities.  Psych: appropriate mood and affect, cooperative with exam  Skin: warm, dry, without evidence of rash or abrasions    ED Course & MDM   ED Course as of 02/27/24 1355   Tue Feb 27, 2024   1144 EKG on my interpretation shows normal sinus rhythm with rate of 67 peats per minute.  Normal axis.  QTc of 420 ms, NE interval 166.  No ST elevation or depression, no acute ischemic pattern.  No STEMI.  Incomplete right bundle fady block present.  No acute changes.  Unremarkable EKG. [NT]      ED Course User Index  [NT] Daniel Espinosa DO         Diagnoses as of 02/27/24 1355   Flu-like symptoms       Medical Decision Making  PMH: PCOS, hypothyroidism, anxiety/depression, GERD  History obtained: directly from patient   Social factors affecting disposition: none    She is in no acute distress on arrival to the emergency department, she is ill-appearing but otherwise her vitals are normal.  She has no focal tenderness on exam to the abdomen, peritoneal signs, I have low suspicion for acute infectious or inflammatory process in the abdomen.  She has no vomiting or diarrhea, no bloody stools, no GI symptoms.  Chest x-ray was ordered to evaluate for pneumonia which was unremarkable.  I did personally review the chest x-ray image.  Laboratory workup in the emergency department was also unremarkable.  There is no  leukocytosis, she does not meet any criteria for sepsis, no severe electrolyte abnormalities or kidney dysfunction.  She is negative for COVID flu and RSV today.  No UTI.  She had a negative D-dimer, excluding pulmonary embolism.  EKG shows no evidence of ACS or arrhythmia.  No obvious cause for her presentation was found today.  She did receive IV fluids while in the ED.  She does admit that she is not eating very well since getting sick about a month ago.  She states that she has lost weight.  I did recommend boost or other nutritional supplements, meal replacement shakes so that she maintains adequate nutrition.  This should also help her body to fight off this infection as well.  She was discharged in stable condition.    XR chest 1 view   Final Result   Unremarkable chest radiograph.        Signed by: Ernesto Santiago 2/27/2024 10:23 AM   Dictation workstation:   [id number]        Labs Reviewed   CBC WITH AUTO DIFFERENTIAL - Abnormal       Result Value    WBC 3.1 (*)     nRBC 0.0      RBC 4.20      Hemoglobin 12.9      Hematocrit 38.1      MCV 91      MCH 30.7      MCHC 33.9      RDW 12.4      Platelets 203      Neutrophils % 45.2      Immature Granulocytes %, Automated 0.0      Lymphocytes % 41.9      Monocytes % 8.7      Eosinophils % 3.2      Basophils % 1.0      Neutrophils Absolute 1.40      Immature Granulocytes Absolute, Automated 0.00      Lymphocytes Absolute 1.30      Monocytes Absolute 0.27      Eosinophils Absolute 0.10      Basophils Absolute 0.03     COMPREHENSIVE METABOLIC PANEL - Abnormal    Glucose 107 (*)     Sodium 140      Potassium 3.5      Chloride 105      Bicarbonate 27      Urea Nitrogen 8      Creatinine 0.60      eGFR >90      Calcium 8.8      Albumin 4.2      Alkaline Phosphatase 51      Total Protein 6.4      AST 13      Bilirubin, Total 0.5      ALT 7      Anion Gap 8     URINALYSIS WITH REFLEX CULTURE AND MICROSCOPIC - Abnormal    Color, Urine Light-Yellow      Appearance, Urine  Clear      Specific Gravity, Urine 1.018      pH, Urine 7.5      Protein, Urine NEGATIVE      Glucose, Urine Normal      Blood, Urine NEGATIVE      Ketones, Urine NEGATIVE      Bilirubin, Urine NEGATIVE      Urobilinogen, Urine 2 (1+) (*)     Nitrite, Urine NEGATIVE      Leukocyte Esterase, Urine NEGATIVE     SARS-COV-2 AND INFLUENZA A/B PCR - Normal    Flu A Result Not Detected      Flu B Result Not Detected      Coronavirus 2019, PCR Not Detected      Narrative:     This assay has received FDA Emergency Use Authorization (EUA) and  is only authorized for the duration of time that circumstances exist to justify the authorization of the emergency use of in vitro diagnostic tests for the detection of SARS-CoV-2 virus and/or diagnosis of COVID-19 infection under section 564(b)(1) of the Act, 21 U.S.C. 360bbb-3(b)(1). Testing for SARS-CoV-2 is only recommended for patients who meet current clinical and/or epidemiological criteria as defined by federal, state, or local public health directives. This assay is an in vitro diagnostic nucleic acid amplification test for the qualitative detection of SARS-CoV-2, Influenza A, and Influenza B from nasopharyngeal specimens and has been validated for use at [facility]. Negative results do not preclude COVID-19 infections or Influenza A/B infections, and should not be used as the sole basis for diagnosis, treatment, or other management decisions. If Influenza A/B and RSV PCR results are negative, testing for Parainfluenza virus, Adenovirus and Metapneumovirus is routinely performed for [facility] [place] and intensive care inpatients, and is available on other patients by placing an add-on request.    RSV PCR - Normal    RSV PCR Not Detected      Narrative:     This assay is an FDA-cleared, in vitro diagnostic nucleic acid amplification test for the detection of RSV from nasopharyngeal specimens, and has been validated for use at [facility]. Negative results do not preclude RSV infections, and should not be used as the sole basis for diagnosis, treatment, or other management decisions. If Influenza A/B and RSV PCR results are negative, testing for Parainfluenza virus, Adenovirus and Metapneumovirus is routinely performed for pediatric oncology and intensive care inpatients at [facility], and is available on other patients by placing an add-on request.       D-DIMER, NON VTE - Normal    D-Dimer Non VTE, Quant (mg/L FEU) 0.24      Narrative:     THROMBOEMBOLIC EVENTS CANNOT BE EXCLUDED SOLELY ON THE BASIS OF THE D-DIMER LEVEL BEING WITHIN THE NORMAL REFERENCE RANGE. D-DIMER LEVELS LESS THAN 0.5 MG/L FEU IN CONJUNCTION WITH A LOW CLINICAL PROBABILITY HAVE AN EXCELLENT NEGATIVE PREDICTIVE VALUE IN EXCLUDING A DIAGNOSIS OF PULMONARY EMBOLUS (PE) OR DEEP VEIN THROMBOSIS (DVT). ELEVATED D-DIMER LEVELS ARE NOT SPECIFIC TO PE OR DVT, AND MAY BE SEEN IN PATIENTS WITH DIC, ADVANCED AGE, PREGNANCY, MALIGNANCY, LIVER DISEASE, INFECTION, AND INFLAMMATORY CONDITIONS AMONG OTHERS. D-DIMER LEVELS MAY BE DECREASED IN PATIENTS RECEIVING ANTI-COAGULATION THERAPY.   URINALYSIS WITH REFLEX CULTURE AND MICROSCOPIC    Narrative:     The following orders were created for panel order Urinalysis with Reflex Culture and Microscopic.  Procedure                               Abnormality         Status                     ---------                               -----------         ------                     Urinalysis with Reflex C...[[id number]]  Abnormal            Final result               Extra Urine Gray Tube[[id number]]                                                         Please view results for these tests on the individual orders.   EXTRA URINE GRAY TUBE         Procedure  Procedures     Daniel Espinosa DO  02/27/24 1409

## 2024-02-29 NOTE — PROGRESS NOTES
This is a 43 year old female for REVIEW of CONDITIONS and SHORT TERM DISABILITY PPWK but PRESENTS with SORE THROAT since yesterday with MUCUS and COUGH and has had Sxs of SORE THROAT recently but was also seen at [facility] recently about one week ago for same so this is an unexpected HOSP FU visit in addition to rule out COVID / RSV and FLU and STREP    HAD HEART CHECKED OUT AT [facility] FOR PALPITATIONS AND NO ISSUES LABS WNL AND EKG WNL AND NEG D DIMER AND NO UTI    HAD POOR APPETITE BUT NOW IMPROVING AND POSITIVE ON EBV SO VIRAL ILLNESS AND THIS IS WHAT SHE NEEDS FMLA PPWK FOR THIS ILLNESS      OFF WORK PPWK COMPLETED FOR JAN 16 UNTIL 2/20 AND HAS RETURNED TO WORK BUT IS NOT READY TO RTW NOW DUE TO PERSISTENT SXS AND WILL HOLD OFF ON FMLA      HAD BEEN SENT BY ME [employer] POST TELE VISIT FEB 27TH DUE TO WEAKNESS AND PALPITATIONS AND WAS IN TOUCH WITH ON CALL [employer] OVER WEEKEND DR HAMILTON    DENIES CP or SOB    DID HAVE BILIRUBIN IN URINE IN ER AND ALSO LOW WBC SO  WE WILL REPEAT CBC      Medical Decision Making  PMH: PCOS, hypothyroidism, anxiety/depression, GERD  History obtained: directly from patient   Social factors affecting disposition: none     She is in no acute distress on arrival to the emergency department, she is ill-appearing but otherwise her vitals are normal.  She has no focal tenderness on exam to the abdomen, peritoneal signs, I have low suspicion for acute infectious or inflammatory process in the abdomen.  She has no vomiting or diarrhea, no bloody stools, no GI symptoms.  Chest x-ray was ordered to evaluate for pneumonia which was unremarkable.  I did personally review the chest x-ray image.  Laboratory workup in the emergency department was also unremarkable.  There is no leukocytosis, she does not meet any criteria for sepsis, no severe electrolyte abnormalities or kidney dysfunction.  She is negative for COVID flu and RSV today.  No UTI.  She had a negative D-dimer, excluding pulmonary  embolism.  EKG shows no evidence of ACS or arrhythmia.  No obvious cause for her presentation was found today.  She did receive IV fluids while in the ED.  She does admit that she is not eating very well since getting sick about a month ago.  She states that she has lost weight.  I did recommend boost or other nutritional supplements, meal replacement shakes so that she maintains adequate nutrition.  This should also help her body to fight off this infection as well.  She was discharged in stable condition.     XR chest 1 view   Final Result   Unremarkable chest radiograph.        Signed by: Ernesto Santiago 2/27/2024 10:23 AM   Dictation workstation:   [id number]               ROS is NEG for HEADACHE, NAUSEA, VOMITING, DIARRHEA, CHEST PAIN, SOB, and BLEEDING and as further REVIEWED BELOW.    Subjective   Catalina Meyers is a 43 y.o. female who presents for .  HPI:    See above    Review of systems is essentially negative for all systems except for any identified issues in HPI above.    Objective     /84   Pulse 88   Temp 37.2 °C (98.9 °F)   Wt 50.2 kg (110 lb 9.6 oz)   SpO2 99%   BMI 17.85 kg/m²      Physical Examination:       GENERAL           General Appearance: well-appearing, well-developed, well-hydrated, well-nourished, no acute distress.        HEENT           NECK supple, no masses or thyromegaly, no carotid bruit.        EYES           Extraocular Movements: normal, bilateral eyes EMILIA, no conjunctival injection.        HEART           Rate and Rhythm regular rate and rhythm. Heart sounds: normal S1S2, no S3 or S4. Murmurs: none.        CHEST           Breath sounds: Clear to IPPA, RR<16 no use of accessory muscles.        ABDOMEN           General: Neg for LKKS or masses, no scleral icterus or jaundice.        MUSCULOSKELETAL           Joints Demonstration: Neg for erythema, swelling or joint deformities. gross abnormalities no gross abnormalities.        EXTREMITIES           Lower  Extremities: Neg for cyanosis, clubbing or edema.       Assessment/Plan   REMAIN OFF WORK FOR ONE MORE WEEK AND WE WILL RE ASSESS BEST THOUGHT IS THAT YOU HAVE A COMPLICATED POST VIRAL EBV INFECTION AND MAY NEED A LONGER PERIOD OF TIME TO RECOVER.    SET UP A FU VISIT IN ABOUT 1 WEEK TO EXPLORE RTW OPTIONS THEN    Problem List Items Addressed This Visit    None  Visit Diagnoses       Viral illness    -  Primary    Weakness        Weight loss        Vaping-related disorder        EBV (Collins-Barr virus) viremia        Lower abdominal pain        Elevated glucose        Sore throat        Relevant Orders    POCT Rapid Strep A manually resulted    Subacute cough        Health counseling        Immunization counseling        Acute upper respiratory infection        Relevant Orders    POCT Rapid Strep A manually resulted    Sars-CoV-2 and Influenza A/B PCR    RSV PCR            FOLLOW UP:  PRN and as specified above         Meli Burks M.D.

## 2024-03-01 ENCOUNTER — LAB (OUTPATIENT)
Dept: LAB | Facility: LAB | Age: 44
End: 2024-03-01
Payer: COMMERCIAL

## 2024-03-01 ENCOUNTER — OFFICE VISIT (OUTPATIENT)
Dept: PRIMARY CARE | Facility: CLINIC | Age: 44
End: 2024-03-01
Payer: COMMERCIAL

## 2024-03-01 ENCOUNTER — HOSPITAL ENCOUNTER (OUTPATIENT)
Dept: RADIOLOGY | Facility: CLINIC | Age: 44
End: 2024-03-01
Payer: COMMERCIAL

## 2024-03-01 VITALS
WEIGHT: 110.6 LBS | TEMPERATURE: 98.9 F | SYSTOLIC BLOOD PRESSURE: 132 MMHG | DIASTOLIC BLOOD PRESSURE: 84 MMHG | HEART RATE: 88 BPM | OXYGEN SATURATION: 99 % | BODY MASS INDEX: 17.85 KG/M2

## 2024-03-01 DIAGNOSIS — R10.30 LOWER ABDOMINAL PAIN: ICD-10-CM

## 2024-03-01 DIAGNOSIS — B34.9 VIRAL ILLNESS: Primary | ICD-10-CM

## 2024-03-01 DIAGNOSIS — Z71.85 IMMUNIZATION COUNSELING: ICD-10-CM

## 2024-03-01 DIAGNOSIS — R53.1 WEAKNESS: ICD-10-CM

## 2024-03-01 DIAGNOSIS — R05.2 SUBACUTE COUGH: ICD-10-CM

## 2024-03-01 DIAGNOSIS — R73.09 ELEVATED GLUCOSE: ICD-10-CM

## 2024-03-01 DIAGNOSIS — Z71.9 HEALTH COUNSELING: ICD-10-CM

## 2024-03-01 DIAGNOSIS — B27.00 EBV (EPSTEIN-BARR VIRUS) VIREMIA: ICD-10-CM

## 2024-03-01 DIAGNOSIS — R63.4 WEIGHT LOSS: ICD-10-CM

## 2024-03-01 DIAGNOSIS — U07.0 VAPING-RELATED DISORDER: ICD-10-CM

## 2024-03-01 DIAGNOSIS — R82.2 BILIRUBINURIA: ICD-10-CM

## 2024-03-01 DIAGNOSIS — J06.9 ACUTE UPPER RESPIRATORY INFECTION: ICD-10-CM

## 2024-03-01 DIAGNOSIS — D72.829 LEUKOCYTOSIS, UNSPECIFIED TYPE: ICD-10-CM

## 2024-03-01 DIAGNOSIS — J02.9 SORE THROAT: ICD-10-CM

## 2024-03-01 DIAGNOSIS — Z09 HOSPITAL DISCHARGE FOLLOW-UP: ICD-10-CM

## 2024-03-01 LAB
BASOPHILS # BLD AUTO: 0.05 X10*3/UL (ref 0–0.1)
BASOPHILS NFR BLD AUTO: 0.8 %
EOSINOPHIL # BLD AUTO: 0.06 X10*3/UL (ref 0–0.7)
EOSINOPHIL NFR BLD AUTO: 1 %
ERYTHROCYTE [DISTWIDTH] IN BLOOD BY AUTOMATED COUNT: 12.7 % (ref 11.5–14.5)
HCT VFR BLD AUTO: 39.6 % (ref 36–46)
HGB BLD-MCNC: 13 G/DL (ref 12–16)
IMM GRANULOCYTES # BLD AUTO: 0.02 X10*3/UL (ref 0–0.7)
IMM GRANULOCYTES NFR BLD AUTO: 0.3 % (ref 0–0.9)
LYMPHOCYTES # BLD AUTO: 1.03 X10*3/UL (ref 1.2–4.8)
LYMPHOCYTES NFR BLD AUTO: 17.4 %
MCH RBC QN AUTO: 31 PG (ref 26–34)
MCHC RBC AUTO-ENTMCNC: 32.8 G/DL (ref 32–36)
MCV RBC AUTO: 95 FL (ref 80–100)
MONOCYTES # BLD AUTO: 0.48 X10*3/UL (ref 0.1–1)
MONOCYTES NFR BLD AUTO: 8.1 %
NEUTROPHILS # BLD AUTO: 4.29 X10*3/UL (ref 1.2–7.7)
NEUTROPHILS NFR BLD AUTO: 72.4 %
NRBC BLD-RTO: 0 /100 WBCS (ref 0–0)
PLATELET # BLD AUTO: 213 X10*3/UL (ref 150–450)
POC RAPID STREP: NEGATIVE
RBC # BLD AUTO: 4.19 X10*6/UL (ref 4–5.2)
RSV RNA RESP QL NAA+PROBE: NOT DETECTED
WBC # BLD AUTO: 5.9 X10*3/UL (ref 4.4–11.3)

## 2024-03-01 PROCEDURE — 87880 STREP A ASSAY W/OPTIC: CPT | Performed by: FAMILY MEDICINE

## 2024-03-01 PROCEDURE — 1036F TOBACCO NON-USER: CPT | Performed by: FAMILY MEDICINE

## 2024-03-01 PROCEDURE — 85025 COMPLETE CBC W/AUTO DIFF WBC: CPT

## 2024-03-01 PROCEDURE — 87637 SARSCOV2&INF A&B&RSV AMP PRB: CPT | Mod: WESLAB | Performed by: FAMILY MEDICINE

## 2024-03-01 PROCEDURE — 99214 OFFICE O/P EST MOD 30 MIN: CPT | Performed by: FAMILY MEDICINE

## 2024-03-01 PROCEDURE — 87081 CULTURE SCREEN ONLY: CPT | Mod: WESLAB | Performed by: FAMILY MEDICINE

## 2024-03-01 PROCEDURE — 36415 COLL VENOUS BLD VENIPUNCTURE: CPT

## 2024-03-01 ASSESSMENT — PAIN SCALES - GENERAL: PAINLEVEL: 4

## 2024-03-02 LAB
FLUAV RNA RESP QL NAA+PROBE: NOT DETECTED
FLUBV RNA RESP QL NAA+PROBE: NOT DETECTED
SARS-COV-2 RNA RESP QL NAA+PROBE: DETECTED

## 2024-03-03 NOTE — PROGRESS NOTES
This is a 43 year old female for ONE WEEK FU visit shown at  a PPWK DISABILITY VISIT for other issues a week ago to be COVID POSITIVE      ROS is NEG for HEADACHE, NAUSEA, VOMITING, DIARRHEA, CHEST PAIN, SOB, and BLEEDING and as further REVIEWED BELOW.    Subjective   Catalina Meyers is a 43 y.o. female who presents for No chief complaint on file..    HPI:    See above    Review of systems is essentially negative for all systems except for any identified issues in HPI above.    Objective     There were no vitals taken for this visit.     Physical Examination:       GENERAL           General Appearance: well-appearing, well-developed, well-hydrated, well-nourished, no acute distress.        HEENT           NECK supple, no masses or thyromegaly, no carotid bruit.        EYES           Extraocular Movements: normal, bilateral eyes EMILIA, no conjunctival injection.        HEART           Rate and Rhythm regular rate and rhythm. Heart sounds: normal S1S2, no S3 or S4. Murmurs: none.        CHEST           Breath sounds: Clear to IPPA, RR<16 no use of accessory muscles.        ABDOMEN           General: Neg for LKKS or masses, no scleral icterus or jaundice.        MUSCULOSKELETAL           Joints Demonstration: Neg for erythema, swelling or joint deformities. gross abnormalities no gross abnormalities.        EXTREMITIES           Lower Extremities: Neg for cyanosis, clubbing or edema.       Assessment/Plan   Problem List Items Addressed This Visit    None      FOLLOW UP:  PRN and as specified above         Meli Burks M.D.

## 2024-03-04 ENCOUNTER — TELEMEDICINE (OUTPATIENT)
Dept: PRIMARY CARE | Facility: CLINIC | Age: 44
End: 2024-03-04
Payer: COMMERCIAL

## 2024-03-04 DIAGNOSIS — U07.1 COVID-19: Primary | ICD-10-CM

## 2024-03-04 LAB — S PYO THROAT QL CULT: NORMAL

## 2024-03-04 PROCEDURE — 99442 PR PHYS/QHP TELEPHONE EVALUATION 11-20 MIN: CPT | Performed by: FAMILY MEDICINE

## 2024-03-04 PROCEDURE — 1036F TOBACCO NON-USER: CPT | Performed by: FAMILY MEDICINE

## 2024-03-04 NOTE — PROGRESS NOTES
This is a 43 year old female with COVID POSITIVE status    She has CHEST and SINUS CONGESTION    REST and FLUIDS are recommended--and hydrate with PEDIALYTE or GATORADE  SHE DOES USE ALBUTEROL prn   USE TYLENOL or ADVIL for ACHES and PAINs     NEW CDC GUIDELINES no longer require HOME QUARNTINE but she will remain at home until and I endorse this.    IF SOB or CP occur to report to ER of choice    IMMUN STATUS is IMMUNIZED only initial TWO COVIDS and is URGED TO SEEK BOOSTERS in 90 available and get on track for keeping up on this.     OK TO REMAIN off work until MONDAY at pt request which I endorse even though CDC has changed this guideline this past weekend.   Report given to SUZANNA Escamilla

## 2024-03-07 ENCOUNTER — HOSPITAL ENCOUNTER (OUTPATIENT)
Dept: RADIOLOGY | Facility: CLINIC | Age: 44
Discharge: HOME | End: 2024-03-07
Payer: COMMERCIAL

## 2024-03-07 DIAGNOSIS — R82.2 BILIRUBINURIA: ICD-10-CM

## 2024-03-07 PROCEDURE — 76705 ECHO EXAM OF ABDOMEN: CPT | Performed by: STUDENT IN AN ORGANIZED HEALTH CARE EDUCATION/TRAINING PROGRAM

## 2024-03-07 PROCEDURE — 76705 ECHO EXAM OF ABDOMEN: CPT

## 2024-03-08 ENCOUNTER — APPOINTMENT (OUTPATIENT)
Dept: PRIMARY CARE | Facility: CLINIC | Age: 44
End: 2024-03-08
Payer: COMMERCIAL

## 2024-03-11 ENCOUNTER — TELEPHONE (OUTPATIENT)
Dept: PRIMARY CARE | Facility: CLINIC | Age: 44
End: 2024-03-11

## 2024-03-11 NOTE — PROGRESS NOTES
This is a 43 year old female for FU to US result and for PPWK DAFNE and Hx of COVID POSITIVE MARCH 1st, 2024:        Study Result    Narrative & Impression   Interpreted By:  Gordo Oviedo,   STUDY:  US RIGHT UPPER QUADRANT; 3/7/2024 11:30 am      INDICATION:  Signs/Symptoms:BILIRUBINURIA.      COMPARISON:  CT abdomen and pelvis 01/22/2024.      ACCESSION NUMBER(S):  [id number]      ORDERING CLINICIAN:  SONG STEWART      TECHNIQUE:  Sonography of the right upper quadrant  was performed.      FINDINGS:  Pancreas: Normal.      Liver:  *Size: 13 cm.  *Echotexture: Normal, homogeneous.  *Echogenicity: Normal.  *Surface contour: Smooth.  *Lesions: Left hepatic dome echogenic mass measuring 1.6 cm.      Biliary: No intrahepatic biliary duct dilation.      CBD: 2 mm at the hilum, normal.      Gallbladder:  *Caliber: Non-dilated.  *Stones: None.  *Sludge: None.  *Wall thickening: Negative.  *Sonographic [place] sign: Negative.      Right Kidney: No hydronephrosis.      Ascites: None.      IMPRESSION:  No bile duct dilation.      Stable left hepatic hemangioma compared to recent CT.      MACRO:  None.      Signed by: Gordo Oviedo 3/8/2024 4:57 PM  Dictation workstation:   [id number]       ROS is NEG for HEADACHE, NAUSEA, VOMITING, DIARRHEA, CHEST PAIN, SOB, and BLEEDING and as further REVIEWED BELOW.    Subjective   Catalina Meyers is a 43 y.o. female who presents for No chief complaint on file..    HPI:    Per nursing intake, pt here for No chief complaint on file.       Review of systems is essentially negative for all systems except for any identified issues in HPI above.    Objective     There were no vitals taken for this visit.     Physical Examination:       GENERAL           General Appearance: well-appearing, well-developed, well-hydrated, well-nourished, no acute distress.        HEENT           NECK supple, no masses or thyromegaly, no carotid bruit.        EYES           Extraocular Movements:  normal, bilateral eyes EMILIA, no conjunctival injection.        HEART           Rate and Rhythm regular rate and rhythm. Heart sounds: normal S1S2, no S3 or S4. Murmurs: none.        CHEST           Breath sounds: Clear to IPPA, RR<16 no use of accessory muscles.        ABDOMEN           General: Neg for LKKS or masses, no scleral icterus or jaundice.        MUSCULOSKELETAL           Joints Demonstration: Neg for erythema, swelling or joint deformities. gross abnormalities no gross abnormalities.        EXTREMITIES           Lower Extremities: Neg for cyanosis, clubbing or edema.       Assessment/Plan   Problem List Items Addressed This Visit    None      FOLLOW UP:  PRN and as specified above         Meli Burks M.D.

## 2024-03-12 ENCOUNTER — APPOINTMENT (OUTPATIENT)
Dept: PRIMARY CARE | Facility: CLINIC | Age: 44
End: 2024-03-12
Payer: COMMERCIAL

## 2024-03-12 NOTE — PROGRESS NOTES
This is a 43 year old female for FU on US RESULTS and needs [place] PPWK completed as below NO ISSUES STABLE HEMANGIOMA:    ALSO recurrent URI with RECENT Hx COVID ILLNESS re exposed to URI uncertain if RECURRENT COVID or RSV; other also VAPING HABIT and urged to CEASE     WAIT 90 days for NEXT COVID BOOSTERS    IMPRESSION:  No bile duct dilation.      Stable left hepatic hemangioma compared to recent CT.      MACRO:  None.      Signed by: Gordo Oviedo 3/8/2024 4:57 PM  Dictation workstation:   [id number]      ROS is NEG for HEADACHE, NAUSEA, VOMITING, DIARRHEA, CHEST PAIN, SOB, and BLEEDING and as further REVIEWED BELOW.    Subjective   Catalina Meyers is a 43 y.o. female who presents for Results (Review ultrasound).    HPI:    Per nursing intake, pt here for Results (Review ultrasound)       Review of systems is essentially negative for all systems except for any identified issues in HPI above.    Objective     Pulse 90   Temp 36.7 °C (98 °F) (Temporal)   Wt 50.3 kg (111 lb)   SpO2 98%   BMI 17.92 kg/m²      Physical Examination:       GENERAL           General Appearance: well-appearing, well-developed, well-hydrated, well-nourished, no acute distress.        HEENT           NECK supple, no masses or thyromegaly, no carotid bruit.        EYES           Extraocular Movements: normal, bilateral eyes EMILIA, no conjunctival injection.        HEART           Rate and Rhythm regular rate and rhythm. Heart sounds: normal S1S2, no S3 or S4. Murmurs: none.        CHEST           Breath sounds: Clear to IPPA, RR<16 no use of accessory muscles.        ABDOMEN           General: Neg for LKKS or masses, no scleral icterus or jaundice.        MUSCULOSKELETAL           Joints Demonstration: Neg for erythema, swelling or joint deformities. gross abnormalities no gross abnormalities.        EXTREMITIES           Lower Extremities: Neg for cyanosis, clubbing or edema.       Assessment/Plan   Problem List Items Addressed  This Visit    None  Visit Diagnoses       Upper respiratory infection, acute    -  Primary    Relevant Orders    Sars-CoV-2 and Influenza A/B PCR    RSV PCR    Vaping-related disorder        EBV (Collins-Barr virus) viremia        Lower abdominal pain        Viral illness        Health counseling        Weakness        Weight loss        Hepatic hemangioma                FOLLOW UP:  PRN and as specified above         Meli Burks M.D.

## 2024-03-13 ENCOUNTER — OFFICE VISIT (OUTPATIENT)
Dept: PRIMARY CARE | Facility: CLINIC | Age: 44
End: 2024-03-13
Payer: COMMERCIAL

## 2024-03-13 ENCOUNTER — HOSPITAL ENCOUNTER (OUTPATIENT)
Dept: RADIOLOGY | Facility: CLINIC | Age: 44
Discharge: HOME | End: 2024-03-13
Payer: COMMERCIAL

## 2024-03-13 VITALS — OXYGEN SATURATION: 98 % | HEART RATE: 90 BPM | TEMPERATURE: 98 F | WEIGHT: 111 LBS | BODY MASS INDEX: 17.92 KG/M2

## 2024-03-13 DIAGNOSIS — R63.4 WEIGHT LOSS: ICD-10-CM

## 2024-03-13 DIAGNOSIS — Z71.9 HEALTH COUNSELING: ICD-10-CM

## 2024-03-13 DIAGNOSIS — J06.9 UPPER RESPIRATORY INFECTION, ACUTE: Primary | ICD-10-CM

## 2024-03-13 DIAGNOSIS — B27.00 EBV (EPSTEIN-BARR VIRUS) VIREMIA: ICD-10-CM

## 2024-03-13 DIAGNOSIS — R10.30 LOWER ABDOMINAL PAIN: ICD-10-CM

## 2024-03-13 DIAGNOSIS — U07.0 VAPING-RELATED DISORDER: ICD-10-CM

## 2024-03-13 DIAGNOSIS — R53.1 WEAKNESS: ICD-10-CM

## 2024-03-13 DIAGNOSIS — D18.03 HEPATIC HEMANGIOMA: ICD-10-CM

## 2024-03-13 DIAGNOSIS — J06.9 UPPER RESPIRATORY INFECTION, ACUTE: ICD-10-CM

## 2024-03-13 DIAGNOSIS — B34.9 VIRAL ILLNESS: ICD-10-CM

## 2024-03-13 DIAGNOSIS — R05.3 PERSISTENT COUGH: ICD-10-CM

## 2024-03-13 PROCEDURE — 99214 OFFICE O/P EST MOD 30 MIN: CPT | Performed by: FAMILY MEDICINE

## 2024-03-13 PROCEDURE — 87634 RSV DNA/RNA AMP PROBE: CPT | Mod: WESLAB | Performed by: FAMILY MEDICINE

## 2024-03-13 PROCEDURE — 71046 X-RAY EXAM CHEST 2 VIEWS: CPT

## 2024-03-13 PROCEDURE — 71046 X-RAY EXAM CHEST 2 VIEWS: CPT | Performed by: RADIOLOGY

## 2024-03-13 ASSESSMENT — PATIENT HEALTH QUESTIONNAIRE - PHQ9
1. LITTLE INTEREST OR PLEASURE IN DOING THINGS: NOT AT ALL
2. FEELING DOWN, DEPRESSED OR HOPELESS: NOT AT ALL
SUM OF ALL RESPONSES TO PHQ9 QUESTIONS 1 AND 2: 0

## 2024-03-13 ASSESSMENT — PAIN SCALES - GENERAL: PAINLEVEL: 0-NO PAIN

## 2024-03-14 LAB
FLUAV RNA RESP QL NAA+PROBE: NOT DETECTED
FLUBV RNA RESP QL NAA+PROBE: NOT DETECTED
RSV RNA RESP QL NAA+PROBE: NOT DETECTED
SARS-COV-2 RNA RESP QL NAA+PROBE: NOT DETECTED

## 2024-04-03 ENCOUNTER — TELEPHONE (OUTPATIENT)
Dept: PRIMARY CARE | Facility: CLINIC | Age: 44
End: 2024-04-03
Payer: COMMERCIAL

## 2024-04-03 NOTE — TELEPHONE ENCOUNTER
Pt was called for dates and states it was feb 16 through jan 20th.garrick was called and given theses dates

## 2024-04-03 NOTE — TELEPHONE ENCOUNTER
Lisa is calling  from disability office regarding pt's return to work and dates that was missed from work, please call back [phone]

## 2024-04-09 NOTE — TELEPHONE ENCOUNTER
Pt left vm regarding her ultrasound results. Pt also wants to follow up on the paperwork that was dropped off at the office last week  
Pt was notified of ultrasound results. Pt was put on a tele visit scheduled for tomorrow 3/12 due to questions   
see CPN
negative...

## 2024-05-22 DIAGNOSIS — Z76.0 PRESCRIPTION REFILL: ICD-10-CM

## 2024-05-22 RX ORDER — LEVOTHYROXINE SODIUM 125 UG/1
125 TABLET ORAL
Qty: 90 TABLET | Refills: 0 | Status: SHIPPED | OUTPATIENT
Start: 2024-05-22

## 2024-08-26 DIAGNOSIS — Z76.0 PRESCRIPTION REFILL: ICD-10-CM

## 2024-08-26 RX ORDER — LEVOTHYROXINE SODIUM 125 UG/1
125 TABLET ORAL
Qty: 90 TABLET | Refills: 0 | Status: SHIPPED | OUTPATIENT
Start: 2024-08-26

## 2024-08-26 NOTE — TELEPHONE ENCOUNTER
Pt is requesting a refill on RX Levothyroxine, please send to [facility] [address], pt is scheduled as a new pt with Juan J on 09/19/24

## 2024-09-03 ENCOUNTER — TELEPHONE (OUTPATIENT)
Dept: PRIMARY CARE | Facility: CLINIC | Age: 44
End: 2024-09-03
Payer: COMMERCIAL

## 2024-09-03 NOTE — TELEPHONE ENCOUNTER
Burks transfer scheduled with Juan J in unavailable NP slot on 9/19. Please call patient to reschedule. Visit cancelled.

## 2024-09-12 ENCOUNTER — APPOINTMENT (OUTPATIENT)
Dept: PRIMARY CARE | Facility: CLINIC | Age: 44
End: 2024-09-12
Payer: COMMERCIAL

## 2024-09-19 ENCOUNTER — APPOINTMENT (OUTPATIENT)
Dept: PRIMARY CARE | Facility: CLINIC | Age: 44
End: 2024-09-19
Payer: COMMERCIAL

## 2024-10-10 ENCOUNTER — OFFICE VISIT (OUTPATIENT)
Dept: URGENT CARE | Age: 44
End: 2024-10-10
Payer: COMMERCIAL

## 2024-10-10 VITALS
TEMPERATURE: 98.2 F | HEIGHT: 66 IN | SYSTOLIC BLOOD PRESSURE: 137 MMHG | RESPIRATION RATE: 16 BRPM | DIASTOLIC BLOOD PRESSURE: 75 MMHG | BODY MASS INDEX: 18.48 KG/M2 | WEIGHT: 115 LBS | OXYGEN SATURATION: 100 % | HEART RATE: 76 BPM

## 2024-10-10 DIAGNOSIS — J01.00 ACUTE NON-RECURRENT MAXILLARY SINUSITIS: Primary | ICD-10-CM

## 2024-10-10 RX ORDER — PREDNISONE 50 MG/1
50 TABLET ORAL DAILY
Qty: 5 TABLET | Refills: 0 | Status: SHIPPED | OUTPATIENT
Start: 2024-10-10 | End: 2024-10-15

## 2024-10-10 ASSESSMENT — PAIN SCALES - GENERAL: PAINLEVEL: 5

## 2024-10-10 NOTE — LETTER
October 10, 2024     Patient: Catalina Meyers   YOB: 1980   Date of Visit: 10/10/2024       To Whom It May Concern:    Catalina Meyers was seen in my clinic on 10/10/2024 at 10:35 am. Please excuse Catalina for her absence from work on this day to make the appointment.    If you have any questions or concerns, please don't hesitate to call.         Sincerely,         Thais Curiel, DO        CC: No Recipients

## 2024-10-10 NOTE — PROGRESS NOTES
Chief Complaint   Patient presents with    Sinus Problem    Earache     1 week left ear pain       Physical Exam:       GEN: No acute distress    ENT: Bilateral TMs bulging with serous effusions, canals unremarkable, sinus congestion present. Frontal and maxillary sinus tender to finger tap. Pharynx and tonsils mildly hyperemic but without exudate.     Resp: Lungs clear to auscultation bilaterally         Encounter Diagnosis   Name Primary?    Acute non-recurrent maxillary sinusitis Yes        Plan:     Likely viral   Prednisone for sx relief     Thais Curiel, DO

## 2024-10-18 ENCOUNTER — APPOINTMENT (OUTPATIENT)
Dept: PRIMARY CARE | Facility: CLINIC | Age: 44
End: 2024-10-18
Payer: COMMERCIAL

## 2024-11-18 ENCOUNTER — OFFICE VISIT (OUTPATIENT)
Dept: URGENT CARE | Age: 44
End: 2024-11-18
Payer: COMMERCIAL

## 2024-11-18 VITALS
SYSTOLIC BLOOD PRESSURE: 128 MMHG | OXYGEN SATURATION: 100 % | TEMPERATURE: 98.1 F | WEIGHT: 117.6 LBS | HEART RATE: 65 BPM | BODY MASS INDEX: 18.9 KG/M2 | HEIGHT: 66 IN | RESPIRATION RATE: 20 BRPM | DIASTOLIC BLOOD PRESSURE: 70 MMHG

## 2024-11-18 DIAGNOSIS — Z20.828 EXPOSURE TO SARS VIRUS: Primary | ICD-10-CM

## 2024-11-18 DIAGNOSIS — J02.9 SORE THROAT: ICD-10-CM

## 2024-11-18 LAB
POC RAPID STREP: NEGATIVE
POC SARS-COV-2 AG BINAX: NORMAL

## 2024-11-18 PROCEDURE — 87811 SARS-COV-2 COVID19 W/OPTIC: CPT | Performed by: SURGERY

## 2024-11-18 PROCEDURE — 99213 OFFICE O/P EST LOW 20 MIN: CPT | Performed by: SURGERY

## 2024-11-18 PROCEDURE — 3008F BODY MASS INDEX DOCD: CPT | Performed by: SURGERY

## 2024-11-18 PROCEDURE — 87880 STREP A ASSAY W/OPTIC: CPT | Performed by: SURGERY

## 2024-11-18 RX ORDER — DOXYCYCLINE 100 MG/1
100 CAPSULE ORAL 2 TIMES DAILY
Qty: 20 CAPSULE | Refills: 0 | Status: SHIPPED | OUTPATIENT
Start: 2024-11-18 | End: 2024-11-28

## 2024-11-18 ASSESSMENT — PAIN SCALES - GENERAL: PAINLEVEL_OUTOF10: 0-NO PAIN

## 2024-11-18 NOTE — PROGRESS NOTES
Chief Complaint   Patient presents with    Sinus Problem    Sore Throat     X 2 d.    Pain     Tederness under both armpits. X 2 d.       Physical Exam:     GEN: No acute distress    ENT: Bilateral TMs and canals unremarkable, sinus congestion present. Pharynx and tonsils mildly hyperemic but without exudate.     Resp: Lungs clear to auscultation bilaterally       POC Labs:     Office Visit on 11/18/2024   Component Date Value Ref Range Status    POC Rapid Strep 11/18/2024 Negative  Negative Final    POC SHANDRA-COV-2 AG 11/18/2024 Presumptive negative test for SARS-CoV-2 (no antigen detected)  Presumptive negative test for SARS-CoV-2 (no antigen detected) Final        Encounter Diagnoses   Name Primary?    Sore throat     Exposure to SARS virus Yes        Medical Decision Making & Plan:     Doxycycline      11/18/24 at 8:42 AM - Thais Curiel, DO

## 2024-11-18 NOTE — LETTER
November 18, 2024     Patient: Catalina Meyers   YOB: 1980   Date of Visit: 11/18/2024       To Whom It May Concern:    Catalina Meyers was seen in my clinic on 11/18/2024 at 8:30 am. Please excuse Catalina for her absence from work on this day to make the appointment. Patient may return to work on                       without restrictions.     If you have any questions or concerns, please don't hesitate to call.         Sincerely,           Thais Curiel,         CC: No Recipients

## 2024-12-04 ENCOUNTER — OFFICE VISIT (OUTPATIENT)
Dept: PRIMARY CARE | Facility: CLINIC | Age: 44
End: 2024-12-04
Payer: COMMERCIAL

## 2024-12-04 VITALS
OXYGEN SATURATION: 99 % | BODY MASS INDEX: 18.88 KG/M2 | SYSTOLIC BLOOD PRESSURE: 132 MMHG | TEMPERATURE: 98.5 F | HEART RATE: 72 BPM | WEIGHT: 117 LBS | DIASTOLIC BLOOD PRESSURE: 80 MMHG

## 2024-12-04 DIAGNOSIS — E03.9 HYPOTHYROIDISM, UNSPECIFIED TYPE: ICD-10-CM

## 2024-12-04 DIAGNOSIS — F17.200 TOBACCO DEPENDENCY: ICD-10-CM

## 2024-12-04 DIAGNOSIS — Z12.11 ENCOUNTER FOR SCREENING FOR MALIGNANT NEOPLASM OF COLON: ICD-10-CM

## 2024-12-04 DIAGNOSIS — Z00.00 PHYSICAL EXAM: Primary | ICD-10-CM

## 2024-12-04 DIAGNOSIS — Z12.31 SCREENING MAMMOGRAM FOR BREAST CANCER: ICD-10-CM

## 2024-12-04 PROCEDURE — 99396 PREV VISIT EST AGE 40-64: CPT | Performed by: INTERNAL MEDICINE

## 2024-12-04 PROCEDURE — 90471 IMMUNIZATION ADMIN: CPT | Performed by: INTERNAL MEDICINE

## 2024-12-04 PROCEDURE — 90656 IIV3 VACC NO PRSV 0.5 ML IM: CPT | Performed by: INTERNAL MEDICINE

## 2024-12-04 PROCEDURE — 99406 BEHAV CHNG SMOKING 3-10 MIN: CPT | Performed by: INTERNAL MEDICINE

## 2024-12-04 ASSESSMENT — PAIN SCALES - GENERAL: PAINLEVEL_OUTOF10: 0-NO PAIN

## 2024-12-04 ASSESSMENT — PATIENT HEALTH QUESTIONNAIRE - PHQ9
1. LITTLE INTEREST OR PLEASURE IN DOING THINGS: NOT AT ALL
SUM OF ALL RESPONSES TO PHQ9 QUESTIONS 1 AND 2: 0
2. FEELING DOWN, DEPRESSED OR HOPELESS: NOT AT ALL

## 2024-12-04 NOTE — LETTER
December 4, 2024     Patient: Catalina Meyers   YOB: 1980   Date of Visit: 12/4/2024       To Whom It May Concern:    Catalina Meyers was seen in my clinic on 12/4/2024 at 11:00 am. Please excuse Catalina for her absence from work on this day to make the appointment. She received her flu shot at this appointment.    If you have any questions or concerns, please don't hesitate to call.         Sincerely,         Noreen Arita MD        CC: No Recipients

## 2024-12-04 NOTE — LETTER
December 4, 2024     Patient: Catalina Meyers   YOB: 1980   Date of Visit: 12/4/2024       To Whom It May Concern:    Catalina Meyers was seen in my clinic on 12/4/2024 at 11:00 am. Please excuse Catalina for her absence from work on this day to make the appointment.    If you have any questions or concerns, please don't hesitate to call.         Sincerely,         Noreen Arita MD        CC: No Recipients

## 2024-12-04 NOTE — PATIENT INSTRUCTIONS
Quitting Smoking    Quitting smoking is the most important step you can take to improve your health. We're glad you have set a goal to improve your health.    Quit Smoking Resources    In addition to medications, use the STAR plan to help you successfully quit.   Stick with your quit date!   Tell friends, family, and coworkers your quit date. Request their understanding and support.  Anticipate and prepare for challenges. Some examples are withdrawal symptoms, being around others who smoke, and drinking alcohol.  Remove all tobacco products and paraphernalia from your environment. Make your home and vehicles smoke-free.    Free resources for additional support:  National tobacco quitline: 1-800-QUIT-NOW (1-202.703.3915).  SmokefreeTXT is a free text program to assist you in quitting. Visit https://www.smokefree.gov/smokefreetxt for more information.  Feel free to call your care manager at ([phone]) for additional support.

## 2024-12-04 NOTE — PROGRESS NOTES
Subjective   Patient ID: Catalina Meyers is a 44 y.o. female who presents for Annual Exam.    HPI  1.  Physical exam.  Pt has no concerns today.  Pap: last done, s/p hysterectomy  Mammogram: last done 1/2024  Colonoscopy: last done, due     Review of Systems  All pertinent positive symptoms are included in the history of present illness.    All other systems have been reviewed and are negative and noncontributory to this patient's current ailments.    Past Medical History:   Diagnosis Date    Anxiety     Depression     Disease of thyroid gland      Past Surgical History:   Procedure Laterality Date    HYSTERECTOMY      MR HEAD ANGIO WO IV CONTRAST  06/28/2022    MR HEAD ANGIO WO IV CONTRAST [facility]     Social History     Tobacco Use    Smoking status: Every Day    Smokeless tobacco: Current    Tobacco comments:     Vape   Vaping Use    Vaping status: Every Day    Substances: Nicotine, Flavoring    Devices: Disposable   Substance Use Topics    Alcohol use: Not Currently    Drug use: Yes     Frequency: 3.0 times per week     Types: Marijuana     Comment: Weed     Family History   Problem Relation Name Age of Onset    Breast cancer Mother Shraddha Martinez     Breast cancer Maternal Grandmother Jayy montalvo      Allergies   Allergen Reactions    Cephalexin Hives, Rash and Shortness of breath    Sulfamethoxazole-Trimethoprim Hives, Rash and Shortness of breath     Immunization History   Administered Date(s) Administered    Flu vaccine (IIV4), preservative free *Check age/dose* 12/07/2023    Flu vaccine, trivalent, preservative free, age 6 months and greater (Fluarix/Fluzone/Flulaval) 12/04/2024    Moderna SARS-CoV-2 Vaccination 06/04/2021, 07/02/2021    Tdap vaccine, age 7 year and older (BOOSTRIX, ADACEL) 07/02/2021     Current Outpatient Medications   Medication Instructions    albuterol 108 (90 Base) MCG/ACT inhaler 2 puffs, Every 4 hours RT    albuterol 180 mcg, Every 4 hours    hydrOXYzine HCL (Atarax) 25  mg tablet Every 8 hours    levothyroxine (SYNTHROID, LEVOXYL) 125 mcg, oral, Daily before breakfast, Take on an empty stomach.     Objective   Visit Vitals  /80   Pulse 72   Temp 36.9 °C (98.5 °F)   Wt 53.1 kg (117 lb)   SpO2 99%   BMI 18.88 kg/m²   OB Status Hysterectomy   Smoking Status Every Day   BSA 1.57 m²     Office Visit on 11/18/2024   Component Date Value    POC Rapid Strep 11/18/2024 Negative     POC SHANDRA-COV-2 AG 11/18/2024 Presumptive negative test for SARS-CoV-2 (no antigen detected)      Physical Exam  CONSTITUTIONAL - well nourished, well developed, looks like stated age, in no acute distress, not ill-appearing, and not tired appearing  SKIN - normal skin color and pigmentation, normal skin turgor without rash, lesions, or nodules visualized  HEAD - no trauma, normocephalic  EYES - pupils are equal and reactive to light, extraocular muscles are intact, and normal external exam  ENT - TM's intact, no injection, no signs of infection, uvula midline, normal tongue movement and throat normal, no exudate, nasal passage without discharge and patent  NECK - supple without rigidity, no neck mass was observed, no thyromegaly or thyroid nodules  CHEST - clear to auscultation, no wheezing, no crackles and no rales, good effort  CARDIAC - regular rate and regular rhythm, no skipped beats, no murmur  ABDOMEN - no organomegaly, soft, nontender, nondistended, normal bowel sounds, no guarding/rebound/rigidity, negative McBurney sign and negative Cleveland sign  EXTREMITIES - no edema, no deformities  NEUROLOGICAL - normal gait, normal balance, normal motor, no ataxia; alert, oriented and no focal signs  PSYCHIATRIC - alert, pleasant and cordial, age-appropriate  IMMUNOLOGIC - no cervical lymphadenopathy    Assessment/Plan   Problem List Items Addressed This Visit       Hypothyroidism    Relevant Orders    TSH with reflex to Free T4 if abnormal     Other Visit Diagnoses       Physical exam    -  Primary    Relevant  Orders    Lipid Panel    Comprehensive Metabolic Panel    CBC    Screening mammogram for breast cancer        Relevant Orders    BI mammo bilateral screening tomosynthesis    Encounter for screening for malignant neoplasm of colon        Relevant Orders    Colonoscopy Screening; Average Risk Patient         Tobacco Counseling  The patient vapes and does not desire to quit.  Pt advised of risks of vaping. Pt will contact provider, if she needs assistance with smoking cessation.  Pt counseled for 3 minutes.

## 2025-01-22 ENCOUNTER — OFFICE VISIT (OUTPATIENT)
Dept: URGENT CARE | Age: 45
End: 2025-01-22
Payer: COMMERCIAL

## 2025-01-22 VITALS
TEMPERATURE: 98.2 F | HEIGHT: 66 IN | BODY MASS INDEX: 18.71 KG/M2 | WEIGHT: 116.4 LBS | DIASTOLIC BLOOD PRESSURE: 71 MMHG | OXYGEN SATURATION: 99 % | RESPIRATION RATE: 20 BRPM | SYSTOLIC BLOOD PRESSURE: 122 MMHG | HEART RATE: 78 BPM

## 2025-01-22 DIAGNOSIS — J02.9 SORE THROAT: ICD-10-CM

## 2025-01-22 DIAGNOSIS — B34.9 VIRAL SYNDROME: ICD-10-CM

## 2025-01-22 DIAGNOSIS — Z20.822 COVID-19 RULED OUT: Primary | ICD-10-CM

## 2025-01-22 LAB
POC RAPID INFLUENZA A: NEGATIVE
POC RAPID INFLUENZA B: NEGATIVE
POC RAPID STREP: NEGATIVE
POC SARS-COV-2 AG BINAX: NORMAL

## 2025-01-22 ASSESSMENT — ENCOUNTER SYMPTOMS
SORE THROAT: 1
SINUS PRESSURE: 1

## 2025-01-22 ASSESSMENT — PAIN SCALES - GENERAL: PAINLEVEL_OUTOF10: 0-NO PAIN

## 2025-01-22 NOTE — LETTER
January 22, 2025     Patient: Catalina Meyers   YOB: 1980   Date of Visit: 1/22/2025       To Whom It May Concern:    Catalina Meyers was seen in my clinic on 1/22/2025 at 11:40 am. Please excuse Catalina for her absence from work on this day to make the appointment. Patient may return to work on 1/23/2025.    If you have any questions or concerns, please don't hesitate to call.         Sincerely,         Bi Starkey,         CC: No Recipients

## 2025-01-22 NOTE — PROGRESS NOTES
"Subjective   Patient ID: Catalina Meyers is a 44 y.o. female. They present today with a chief complaint of Sinus Problem (Woke up with Sinus pressure and swollen glands. ) and Request For Covid Testing (Exposed to Covid at work.).    History of Present Illness  HPI  This is a 44-year-old female presents today complaining sinus congestion and sore throat for the past few days.  Patient states has been exposed to COVID at work and would like to be tested.  Past Medical History  Allergies as of 01/22/2025 - Reviewed 01/22/2025   Allergen Reaction Noted    Cephalexin Hives, Rash, and Shortness of breath 01/19/2024    Sulfamethoxazole-trimethoprim Hives, Rash, and Shortness of breath 01/19/2024       (Not in a hospital admission)       Past Medical History:   Diagnosis Date    Anxiety     Depression     Disease of thyroid gland        Past Surgical History:   Procedure Laterality Date    HYSTERECTOMY      MR HEAD ANGIO WO IV CONTRAST  06/28/2022    MR HEAD ANGIO WO IV CONTRAST [facility]        reports that she has been smoking. She uses smokeless tobacco. She reports that she does not currently use alcohol. She reports current drug use. Frequency: 3.00 times per week. Drug: Marijuana.    Review of Systems  Review of Systems   HENT:  Positive for sinus pressure and sore throat.    All other systems reviewed and are negative.                                 Objective    Vitals:    01/22/25 1153   BP: 122/71   BP Location: Left arm   Patient Position: Sitting   BP Cuff Size: Adult   Pulse: 78   Resp: 20   Temp: 36.8 °C (98.2 °F)   TempSrc: Oral   SpO2: 99%   Weight: 52.8 kg (116 lb 6.4 oz)   Height: 1.676 m (5' 6\")     No LMP recorded. Patient has had a hysterectomy.    Physical Exam  Patient is awake alert oriented x 3 in no acute distress vital signs are stable.  She is afebrile.  Neck supple.  Nontoxic-appearing.  No trismus.  Throat nonerythematous.  TMs are intact EACs were patent no anterior cervical " adenopathy noted.  Lungs are clear throughout.  Procedures    Point of Care Test & Imaging Results from this visit  Results for orders placed or performed in visit on 01/22/25   POCT BinaxNOW Covid-19 Ag Card manually resulted   Result Value Ref Range    POC SHANDRA-COV-2 AG  Presumptive negative test for SARS-CoV-2 (no antigen detected)     Presumptive negative test for SARS-CoV-2 (no antigen detected)   POCT Influenza A/B manually resulted   Result Value Ref Range    POC Rapid Influenza A Negative Negative    POC Rapid Influenza B Negative Negative   POCT rapid strep A manually resulted   Result Value Ref Range    POC Rapid Strep Negative Negative      No results found.    Diagnostic study results (if any) were reviewed by Bi Starkey DO.    Assessment/Plan   Allergies, medications, history, and pertinent labs/EKGs/Imaging reviewed by Bi Starkey DO.     Medical Decision Making      Orders and Diagnoses  Diagnoses and all orders for this visit:  COVID-19 ruled out  -     POCT BinaxNOW Covid-19 Ag Card manually resulted  -     POCT Influenza A/B manually resulted  Sore throat  -     POCT rapid strep A manually resulted      Medical Admin Record      Patient disposition: Home    Electronically signed by Bi Starkey DO  12:55 PM

## 2025-01-30 ENCOUNTER — APPOINTMENT (OUTPATIENT)
Dept: RADIOLOGY | Facility: CLINIC | Age: 45
End: 2025-01-30
Payer: COMMERCIAL

## 2025-02-06 ENCOUNTER — HOSPITAL ENCOUNTER (OUTPATIENT)
Dept: RADIOLOGY | Facility: CLINIC | Age: 45
Discharge: HOME | End: 2025-02-06
Payer: COMMERCIAL

## 2025-02-06 VITALS — BODY MASS INDEX: 18.64 KG/M2 | HEIGHT: 66 IN | WEIGHT: 116 LBS

## 2025-02-06 DIAGNOSIS — Z12.31 SCREENING MAMMOGRAM FOR BREAST CANCER: ICD-10-CM

## 2025-02-06 PROCEDURE — 77067 SCR MAMMO BI INCL CAD: CPT

## 2025-02-06 PROCEDURE — 77067 SCR MAMMO BI INCL CAD: CPT | Performed by: RADIOLOGY

## 2025-02-06 PROCEDURE — 77063 BREAST TOMOSYNTHESIS BI: CPT | Performed by: RADIOLOGY

## 2025-03-03 DIAGNOSIS — E03.9 HYPOTHYROIDISM, UNSPECIFIED TYPE: ICD-10-CM

## 2025-03-04 ENCOUNTER — OFFICE VISIT (OUTPATIENT)
Dept: URGENT CARE | Age: 45
End: 2025-03-04
Payer: COMMERCIAL

## 2025-03-04 DIAGNOSIS — R68.89 FLU-LIKE SYMPTOMS: Primary | ICD-10-CM

## 2025-03-04 DIAGNOSIS — Z20.828 EXPOSURE TO INFLUENZA: ICD-10-CM

## 2025-03-04 PROCEDURE — 99213 OFFICE O/P EST LOW 20 MIN: CPT | Performed by: NURSE PRACTITIONER

## 2025-03-04 RX ORDER — LEVOTHYROXINE SODIUM 125 UG/1
125 TABLET ORAL
Qty: 90 TABLET | Refills: 2 | Status: SHIPPED | OUTPATIENT
Start: 2025-03-04

## 2025-03-04 RX ORDER — OSELTAMIVIR PHOSPHATE 75 MG/1
75 CAPSULE ORAL EVERY 12 HOURS
Qty: 10 CAPSULE | Refills: 0 | Status: SHIPPED | OUTPATIENT
Start: 2025-03-04 | End: 2025-03-09

## 2025-03-04 ASSESSMENT — ENCOUNTER SYMPTOMS
RHINORRHEA: 1
CHILLS: 1
VOMITING: 1
EYES NEGATIVE: 1
ENDOCRINE NEGATIVE: 1
RESPIRATORY NEGATIVE: 1
NAUSEA: 1
PSYCHIATRIC NEGATIVE: 1
HEMATOLOGIC/LYMPHATIC NEGATIVE: 1
ALLERGIC/IMMUNOLOGIC NEGATIVE: 1
MUSCULOSKELETAL NEGATIVE: 1
DIARRHEA: 1
CARDIOVASCULAR NEGATIVE: 1
NEUROLOGICAL NEGATIVE: 1

## 2025-03-04 NOTE — PROGRESS NOTES
Subjective   Patient ID: Catalina Meyers is a 44 y.o. female. They present today with a chief complaint of No chief complaint on file..    History of Present Illness  Patient is a 45 y/o female presenting for virtual examination with c/o nausea, vomiting, diarrhea, HA, nasal congestion/drainage, chills x1 day. Patient reports potential exposure to Influenza at work. Patient denies symptoms of chills, bodyaches, lethargy, weakness, chest pain/tightness/pressure, SOB, wheezing, coffee-ground emesis, black-tarry stools, ABD pain. No OTC medication reported to be taken.           Past Medical History  Allergies as of 03/04/2025 - Reviewed 03/04/2025   Allergen Reaction Noted    Cephalexin Hives, Rash, and Shortness of breath 01/19/2024    Sulfamethoxazole-trimethoprim Hives, Rash, and Shortness of breath 01/19/2024       (Not in a hospital admission)       Past Medical History:   Diagnosis Date    Anxiety     Depression     Disease of thyroid gland        Past Surgical History:   Procedure Laterality Date    HYSTERECTOMY  2005    MR HEAD ANGIO WO IV CONTRAST  06/28/2022    MR HEAD ANGIO WO IV CONTRAST [facility]        reports that she has been smoking. She uses smokeless tobacco. She reports that she does not currently use alcohol. She reports current drug use. Frequency: 3.00 times per week. Drug: Marijuana.    Review of Systems  Review of Systems   Constitutional:  Positive for chills.   HENT:  Positive for congestion and rhinorrhea.    Eyes: Negative.    Respiratory: Negative.     Cardiovascular: Negative.    Gastrointestinal:  Positive for diarrhea, nausea and vomiting.   Endocrine: Negative.    Genitourinary: Negative.    Musculoskeletal: Negative.    Skin: Negative.    Allergic/Immunologic: Negative.    Neurological: Negative.    Hematological: Negative.    Psychiatric/Behavioral: Negative.                                    Objective    There were no vitals filed for this visit.  No LMP recorded.  Patient has had a hysterectomy.    Physical Exam  Constitutional:       Comments: Patient A/O x4, LOC 5, calm and cooperative. Patient is fatigued but is otherwise well-appearing during virtual examination. Patient speaking in complete sentences and following commands appropriately. Patient in no acute distress   HENT:      Head: Normocephalic and atraumatic.      Nose: Nose normal.   Eyes:      Extraocular Movements: Extraocular movements intact.      Conjunctiva/sclera: Conjunctivae normal.      Pupils: Pupils are equal, round, and reactive to light.   Pulmonary:      Comments: No audible cough during physical examination. Patient speaking in complete sentences without SOB or difficulty. Patient in no acute respiratory distress   Skin:     General: Skin is dry.   Neurological:      General: No focal deficit present.      Mental Status: She is oriented to person, place, and time.   Psychiatric:         Mood and Affect: Mood normal.         Behavior: Behavior normal.         Procedures    Point of Care Test & Imaging Results from this visit  No results found for this visit on 03/04/25.   No results found.    Diagnostic study results (if any) were reviewed by ALYSSA Little.    Assessment/Plan   Allergies, medications, history, and pertinent labs/EKGs/Imaging reviewed by ALYSSA Little.     Medical Decision Making  -Will send Rx of Tamiflu as patient is exhibiting s/s of influenza along with potential exposure in the workplace  -Patient encouraged to obtain at-home COVID-19/Influenza AB combination test prior to beginning Rx  -OTC Motrin/Tylenol, increase fluids, rest  -Discussed limitations of virtual examination along with red-flag s/s to which would indicate an immediate ED evaluation    I have communicated my name and active licensure information to the patient. The patient's identity and physical location were verified at the time of this visit. Either the patient or their legal representative were  informed of the risks and benefits of, and alternatives to, treatment through a remote evaluation. The patient consented to proceed with the evaluation remotely. This remote visit was conducted using video and audio.    At time of discharge, patient was clinically well-appearing and appropriate for outpatient management. The patient/parent/guardian was educated regarding diagnosis, supportive care, OTC and Rx medications. The patient/parent/guardian was given the opportunity to ask questions prior to discharge. They verbalized understanding of discussion of treatment plan, expected course of illness and/or injury, indications on when to return to , when to seek further evaluation in ED/call 911, and the need to follow up with PCP and/or specialist as referred. Patient/parent/guardian was provided with work/school documentation if requested. Patient stable upon discharge.     Orders and Diagnoses  Diagnoses and all orders for this visit:  Flu-like symptoms  -     oseltamivir (Tamiflu) 75 mg capsule; Take 1 capsule (75 mg) by mouth every 12 hours for 5 days.  Exposure to influenza  -     oseltamivir (Tamiflu) 75 mg capsule; Take 1 capsule (75 mg) by mouth every 12 hours for 5 days.      Medical Admin Record      Patient disposition: Home    Electronically signed by ALYSSA Little  12:50 PM    Urgent Care Virtual Video Visit    Patient Location: Home  Provider Location: [facility]    Video visit completed with realtime synchronous video/audio connection. Informed consent was obtained from the patient. Patient was made aware that my evaluation and diagnosis are limited due to the fact that we are not in the same room during the interview and that this is a virtual encounter that took place via videoconferencing. Patient verbalized understanding.     Patient disposition: Home    Electronically signed by ALYSSA Little  12:50 PM

## 2025-03-04 NOTE — LETTER
March 4, 2025     Patient: Catalina Meyers   YOB: 1980   Date of Visit: 3/4/2025       To Whom It May Concern:    Catalina Meyers was seen in my clinic on 3/4/2025 at 12:20 pm. Please excuse Catalina for her absence from work on 3/4/25 , 3/5/25 , 3/6/25. May return on 3/7/25 or sooner if symptoms improve/subside.    If you have any questions or concerns, please don't hesitate to call.         Sincerely,         Tejal Wu, APRN-CNP        CC: No Recipients

## 2025-04-25 ENCOUNTER — OFFICE VISIT (OUTPATIENT)
Dept: URGENT CARE | Age: 45
End: 2025-04-25
Payer: COMMERCIAL

## 2025-04-25 VITALS
HEART RATE: 75 BPM | HEIGHT: 66 IN | DIASTOLIC BLOOD PRESSURE: 87 MMHG | TEMPERATURE: 98.2 F | WEIGHT: 117 LBS | OXYGEN SATURATION: 100 % | SYSTOLIC BLOOD PRESSURE: 135 MMHG | BODY MASS INDEX: 18.8 KG/M2 | RESPIRATION RATE: 16 BRPM

## 2025-04-25 DIAGNOSIS — J06.9 VIRAL URI: Primary | ICD-10-CM

## 2025-04-25 LAB
POC HUMAN RHINOVIRUS PCR: NEGATIVE
POC INFLUENZA A VIRUS PCR: NEGATIVE
POC INFLUENZA B VIRUS PCR: NEGATIVE
POC RESPIRATORY SYNCYTIAL VIRUS PCR: NEGATIVE
POC STREPTOCOCCUS PYOGENES (GROUP A STREP) PCR: NEGATIVE

## 2025-04-25 PROCEDURE — 87631 RESP VIRUS 3-5 TARGETS: CPT | Performed by: SURGERY

## 2025-04-25 PROCEDURE — 3008F BODY MASS INDEX DOCD: CPT | Performed by: SURGERY

## 2025-04-25 PROCEDURE — 87651 STREP A DNA AMP PROBE: CPT | Performed by: SURGERY

## 2025-04-25 PROCEDURE — 99070 SPECIAL SUPPLIES PHYS/QHP: CPT | Performed by: SURGERY

## 2025-04-25 PROCEDURE — 99213 OFFICE O/P EST LOW 20 MIN: CPT | Performed by: SURGERY

## 2025-04-25 RX ORDER — AZITHROMYCIN 250 MG/1
TABLET, FILM COATED ORAL
Qty: 6 TABLET | Refills: 0 | Status: SHIPPED | OUTPATIENT
Start: 2025-04-25 | End: 2025-04-30

## 2025-04-25 RX ORDER — PREDNISONE 20 MG/1
40 TABLET ORAL DAILY
Qty: 20 TABLET | Refills: 0 | Status: SHIPPED | OUTPATIENT
Start: 2025-04-25 | End: 2025-04-30

## 2025-04-25 ASSESSMENT — PAIN SCALES - GENERAL: PAINLEVEL_OUTOF10: 0-NO PAIN

## 2025-04-25 NOTE — PROGRESS NOTES
Chief Complaint   Patient presents with    Cough     Patient states this has been going on for 2 days    Sore Throat       Physical Exam:     GEN: No acute distress    ENT: Bilateral TMs and canals unremarkable, sinus congestion present. Pharynx and tonsils mildly hyperemic but without exudate.     Resp: Lungs clear to auscultation bilaterally       POC Labs:     Office Visit on 04/25/2025   Component Date Value Ref Range Status    POC Group A Strep, PCR 04/25/2025 Negative  Negative Final    POC Respiratory Syncytial Virus PCR 04/25/2025 Negative  Negative Final    POC Influenza A Virus PCR 04/25/2025 Negative  Negative Final    POC Influenza B Virus PCR 04/25/2025 Negative  Negative Final    POC Human Rhinovirus PCR 04/25/2025 Negative  Negative Final        Encounter Diagnosis   Name Primary?    Viral URI Yes        Medical Decision Making & Plan:   Prednisone for sx relief.   If no improvement in 2-3 days of prednisone may start azithromycin    Home      04/25/25 at 8:58 AM - Thais Curiel, DO

## 2025-04-25 NOTE — LETTER
April 25, 2025     Patient: Catalina Meyers   YOB: 1980   Date of Visit: 4/25/2025       To Whom It May Concern:    It is my medical opinion that Catalina Meyers may return to work on 4/28/2025 .    If you have any questions or concerns, please don't hesitate to call.         Sincerely,         Thais Curiel, DO    CC: No Recipients

## 2025-05-02 ENCOUNTER — OFFICE VISIT (OUTPATIENT)
Dept: PRIMARY CARE | Facility: CLINIC | Age: 45
End: 2025-05-02
Payer: COMMERCIAL

## 2025-05-02 VITALS
HEIGHT: 66 IN | DIASTOLIC BLOOD PRESSURE: 78 MMHG | WEIGHT: 110 LBS | TEMPERATURE: 98.6 F | HEART RATE: 77 BPM | SYSTOLIC BLOOD PRESSURE: 132 MMHG | BODY MASS INDEX: 17.68 KG/M2 | OXYGEN SATURATION: 98 %

## 2025-05-02 DIAGNOSIS — Z12.11 ENCOUNTER FOR SCREENING FOR MALIGNANT NEOPLASM OF COLON: ICD-10-CM

## 2025-05-02 DIAGNOSIS — E03.9 HYPOTHYROIDISM, UNSPECIFIED TYPE: ICD-10-CM

## 2025-05-02 DIAGNOSIS — J40 BRONCHITIS: Primary | ICD-10-CM

## 2025-05-02 PROCEDURE — 99213 OFFICE O/P EST LOW 20 MIN: CPT | Performed by: INTERNAL MEDICINE

## 2025-05-02 PROCEDURE — 3008F BODY MASS INDEX DOCD: CPT | Performed by: INTERNAL MEDICINE

## 2025-05-02 RX ORDER — IBUPROFEN 600 MG/1
600 TABLET ORAL EVERY 6 HOURS PRN
COMMUNITY
Start: 2025-04-29 | End: 2025-05-07

## 2025-05-02 RX ORDER — INHALER, ASSIST DEVICES
SPACER (EA) MISCELLANEOUS
COMMUNITY
Start: 2025-04-29

## 2025-05-02 RX ORDER — ONDANSETRON 4 MG/1
4 TABLET, ORALLY DISINTEGRATING ORAL EVERY 6 HOURS PRN
COMMUNITY
Start: 2025-04-29

## 2025-05-02 RX ORDER — LORATADINE 10 MG/1
10 TABLET ORAL
COMMUNITY
Start: 2025-04-29 | End: 2025-05-29

## 2025-05-02 RX ORDER — GUAIFENESIN 600 MG/1
600 TABLET, EXTENDED RELEASE ORAL 2 TIMES DAILY
COMMUNITY
Start: 2025-04-29 | End: 2025-05-06

## 2025-05-02 ASSESSMENT — PATIENT HEALTH QUESTIONNAIRE - PHQ9
2. FEELING DOWN, DEPRESSED OR HOPELESS: NOT AT ALL
SUM OF ALL RESPONSES TO PHQ9 QUESTIONS 1 AND 2: 0
1. LITTLE INTEREST OR PLEASURE IN DOING THINGS: NOT AT ALL

## 2025-05-02 ASSESSMENT — PAIN SCALES - GENERAL: PAINLEVEL_OUTOF10: 0-NO PAIN

## 2025-05-02 NOTE — PROGRESS NOTES
"Subjective   Patient ID: Catalina Meyers is a 44 y.o. female who presents for No chief complaint on file..    This is a 44 y.o. presenting for UC and ER follow up. Pt had cough, sore throat and body aches. She was treated with ATBx, steroids, inhaler, cough medicine and allergy meds. She states that she is feeling better. She denies fever, chills, SOB, HA.         Review of Systems   Constitutional:  Negative for activity change, chills, fatigue and fever.   HENT:  Positive for sore throat. Negative for congestion.    Respiratory:  Negative for shortness of breath and wheezing.    Cardiovascular:  Negative for chest pain.   Gastrointestinal:  Negative for nausea and vomiting.   Neurological:  Negative for dizziness and headaches.       Objective   /78   Pulse 77   Temp 37 °C (98.6 °F)   Ht 1.676 m (5' 6\")   Wt 49.9 kg (110 lb)   SpO2 98%   BMI 17.75 kg/m²     Physical Exam  Vitals reviewed.   Constitutional:       General: She is not in acute distress.     Appearance: Normal appearance. She is not ill-appearing.   HENT:      Right Ear: Tympanic membrane normal.      Left Ear: Tympanic membrane normal.      Nose: Nose normal.      Mouth/Throat:      Mouth: Mucous membranes are moist.      Pharynx: Oropharynx is clear. No oropharyngeal exudate or posterior oropharyngeal erythema.   Cardiovascular:      Rate and Rhythm: Normal rate and regular rhythm.      Pulses: Normal pulses.      Heart sounds: Normal heart sounds.   Pulmonary:      Effort: Pulmonary effort is normal.      Breath sounds: Normal breath sounds.   Musculoskeletal:      Cervical back: Normal range of motion and neck supple.   Skin:     General: Skin is warm and dry.   Neurological:      General: No focal deficit present.      Mental Status: She is alert and oriented to person, place, and time.   Psychiatric:         Mood and Affect: Mood normal.         Assessment/Plan   Diagnoses and all orders for this " visit:  Bronchitis  Comments:  Improved. UC and ER records reviewed.  Hypothyroidism, unspecified type  -     Lipid panel; Future  -     Tsh With Reflex To Free T4 If Abnormal; Future  Encounter for screening for malignant neoplasm of colon  -     Colonoscopy Screening; High Risk Patient; Future

## 2025-05-04 ASSESSMENT — ENCOUNTER SYMPTOMS
NAUSEA: 0
SORE THROAT: 1
SHORTNESS OF BREATH: 0
FEVER: 0
HEADACHES: 0
FATIGUE: 0
VOMITING: 0
ACTIVITY CHANGE: 0
DIZZINESS: 0
CHILLS: 0
WHEEZING: 0

## 2025-06-23 ENCOUNTER — OFFICE VISIT (OUTPATIENT)
Dept: URGENT CARE | Age: 45
End: 2025-06-23
Payer: COMMERCIAL

## 2025-06-23 VITALS
OXYGEN SATURATION: 100 % | HEART RATE: 68 BPM | BODY MASS INDEX: 18.48 KG/M2 | WEIGHT: 115 LBS | HEIGHT: 66 IN | RESPIRATION RATE: 18 BRPM | SYSTOLIC BLOOD PRESSURE: 155 MMHG | TEMPERATURE: 97.9 F | DIASTOLIC BLOOD PRESSURE: 86 MMHG

## 2025-06-23 DIAGNOSIS — K59.00 CONSTIPATION, UNSPECIFIED CONSTIPATION TYPE: Primary | ICD-10-CM

## 2025-06-23 DIAGNOSIS — R30.0 DYSURIA: ICD-10-CM

## 2025-06-23 LAB
POC APPEARANCE, URINE: ABNORMAL
POC BILIRUBIN, URINE: NEGATIVE
POC BLOOD, URINE: NEGATIVE
POC COLOR, URINE: YELLOW
POC GLUCOSE, URINE: NEGATIVE MG/DL
POC KETONES, URINE: NEGATIVE MG/DL
POC LEUKOCYTES, URINE: NEGATIVE
POC NITRITE,URINE: NEGATIVE
POC PH, URINE: 8.5 PH
POC PROTEIN, URINE: NEGATIVE MG/DL
POC SPECIFIC GRAVITY, URINE: 1.01
POC UROBILINOGEN, URINE: 0.2 EU/DL
PREGNANCY TEST URINE, POC: NEGATIVE

## 2025-06-23 PROCEDURE — 99213 OFFICE O/P EST LOW 20 MIN: CPT | Performed by: PHYSICIAN ASSISTANT

## 2025-06-23 PROCEDURE — 81003 URINALYSIS AUTO W/O SCOPE: CPT | Performed by: PHYSICIAN ASSISTANT

## 2025-06-23 PROCEDURE — 3008F BODY MASS INDEX DOCD: CPT | Performed by: PHYSICIAN ASSISTANT

## 2025-06-23 PROCEDURE — 81025 URINE PREGNANCY TEST: CPT | Performed by: PHYSICIAN ASSISTANT

## 2025-06-23 PROCEDURE — 1036F TOBACCO NON-USER: CPT | Performed by: PHYSICIAN ASSISTANT

## 2025-06-23 RX ORDER — BISACODYL 5 MG
5 TABLET, DELAYED RELEASE (ENTERIC COATED) ORAL 3 TIMES DAILY PRN
Qty: 12 TABLET | Refills: 0 | Status: SHIPPED | OUTPATIENT
Start: 2025-06-23 | End: 2025-06-27

## 2025-06-23 RX ORDER — POLYETHYLENE GLYCOL 3350 17 G/17G
17 POWDER, FOR SOLUTION ORAL DAILY
Qty: 10 PACKET | Refills: 0 | Status: SHIPPED | OUTPATIENT
Start: 2025-06-23 | End: 2025-07-03

## 2025-06-23 NOTE — LETTER
June 23, 2025     Patient: Catalina Meyers   YOB: 1980   Date of Visit: 6/23/2025       To Whom It May Concern:    Catalina Meyers was seen in my clinic on 6/23/2025 at 11:50 am. Please excuse Catalina for her absence from work on this day to make the appointment. Please excuse from 6/20-6/24/25.    If you have any questions or concerns, please don't hesitate to call.         Sincerely,         Divina New PA-C        CC: No Recipients

## 2025-06-23 NOTE — PATIENT INSTRUCTIONS
VISIT SUMMARY:  During your visit, we discussed your abdominal pain, urinary frequency, and difficulty with bowel movements. We reviewed your medical history, including your past colonoscopy and the removal of polyps. We have developed a plan to address your symptoms and ensure your overall health.    YOUR PLAN:  -CONSTIPATION: Constipation means having infrequent bowel movements, which can cause bloating, nausea, and abdominal pain. You should take a daily stool softener until your bowel movements become regular. If needed, you can use a laxative for severe symptoms. Make sure to eat a high fiber diet with plenty of fruits, vegetables, and whole grains, and drink more water. Seek emergency care if your pain becomes severe, you vomit without having a bowel movement, or your abdomen becomes very hard.    -COLON POLYPS: Colon polyps are growths in the colon, and you have a history of having one precancerous polyp removed. It is important to schedule a follow-up colonoscopy and complete the necessary blood work to monitor your colon health.    INSTRUCTIONS:  Please schedule a follow-up colonoscopy. Seek emergency care if your abdominal pain becomes severe, you experience vomiting without a bowel movement, or your abdomen becomes very hard.

## 2025-06-23 NOTE — PROGRESS NOTES
"Subjective   Patient ID: Catalina Meyers is a 45 y.o. female who presents for Urinary Problem (Dysuria, abdominal pain and constipated since Friday).  History of Present Illness  Catalina Meyers is a 45 year old female who presents with abdominal pain. She has been experiencing abdominal pain characterized by a burning sensation and cramping in the stomach area. The pain is tolerable, rated between a three and four, with occasional sharp pains. She also feels bloated and experiences nausea .    She reports increased urinary frequency, urinating approximately every half hour. Additionally, she has difficulty with bowel movements, noting that she only passes a small amount when she feels the urge to go. Her last normal full bowel movement was earlier last week.    Her past medical history includes a hysterectomy, with her tubes remaining intact. She had a colonoscopy in 2011 or 2012, during which two polyps were found, one precancerous and one cancerous, both of which were removed. She has been taking prebiotics and probiotics, which initially seemed to help, but she is now experiencing bloating again.    No fever or other systemic symptoms reported. She denies noticing blood in stool recently, but has a history of polyps.    ROS is negative unless otherwise stated in HPI.       Objective     /86 (BP Location: Right arm, Patient Position: Sitting)   Pulse 68   Temp 36.6 °C (97.9 °F) (Oral)   Resp 18   Ht 1.676 m (5' 6\")   Wt 52.2 kg (115 lb)   SpO2 100%   BMI 18.56 kg/m²        VS: As documented in the triage note and EMR flowsheet from this visit was reviewed  General: Well appearing. No acute distress.   Eyes:  Extraocular movements grossly intact. No scleral icterus.   Head: Atraumatic. Normocephalic.     Neck: No meningismus. No gross masses. Full movement through range of motion  CV: Regular rhythm. No murmurs, rubs, gallops appreciated.   Resp: Clear to auscultation bilaterally. No respiratory " distress.    GI: Nontender. Soft. No masses. No rebound, rigidity or guarding.   MSK: Symmetric muscle bulk. No gross step offs or deformities.  Skin: Warm, dry. No rashes  Neuro: CN II-VII intact. A&O x3. Speech fluent. Alert. Moving all extremities. Ambulates with normal gait  Psych: Appropriate mood and affect for situation    Point of Care Test & Imaging Results from this visit  Results for orders placed or performed in visit on 06/23/25   POCT UA Automated manually resulted   Result Value Ref Range    POC Color, Urine Yellow Straw, Yellow, Light-Yellow    POC Appearance, Urine Cloudy (A) Clear    POC Glucose, Urine NEGATIVE NEGATIVE mg/dl    POC Bilirubin, Urine NEGATIVE NEGATIVE    POC Ketones, Urine NEGATIVE NEGATIVE mg/dl    POC Specific Gravity, Urine 1.010 1.005 - 1.035    POC Blood, Urine NEGATIVE NEGATIVE    POC PH, Urine 8.5 No Reference Range Established PH    POC Protein, Urine NEGATIVE NEGATIVE mg/dl    POC Urobilinogen, Urine 0.2 0.2, 1.0 EU/DL    Poc Nitrite, Urine NEGATIVE NEGATIVE    POC Leukocytes, Urine NEGATIVE NEGATIVE   POCT pregnancy, urine manually resulted   Result Value Ref Range    Preg Test, Ur Negative Negative      Imaging  No results found.    Cardiology, Vascular, and Other Imaging  No other imaging results found for the past 2 days      Diagnostic study results (if any) were reviewed by Divina New PA-C.    Assessment/Plan   Allergies, medications, history, and pertinent labs/EKGs/Imaging reviewed by Divina New PA-C.     Assessment & Plan  Patient is a 45-year-old female presents for lower abdominal pain, urinary frequency, constipation.  On examination, patient is well-appearing.  Vitals are stable.  Rates her abdominal pain at 3-4 out of 10 in severity.  Notes that she has been urinating more frequently than normal.  Her last normal bowel movement was sometimes last week but has been passing very small movement bowel movements since then.  On examination, her abdomen  is soft and nontender.  Urinalysis negative for infection.  Suspect abdominal pain and urinary frequency secondary to constipation.  Will prescribe stool softener and laxative to take for severe symptoms.  Advised on oral hydration and high-fiber diet.  Advised not hesitate to go to the emergency room for severe abdominal pain, vomiting or if her abdomen feels hard. Patient informed of the diagnosis.  They are agreeable to the plan as discussed above.  Patient given the opportunity to ask questions.  All of the patient's questions were answered. Given precautions in which to seek attention in the emergency department. Discussed follow up with PCP or other appropriate clinician.      Orders and Diagnoses  Diagnoses and all orders for this visit:  Constipation, unspecified constipation type  -     polyethylene glycol (Glycolax, Miralax) 17 gram packet; Take 17 g by mouth once daily for 10 days.  -     bisacodyl (Dulcolax, bisacodyl,) 5 mg EC tablet; Take 1 tablet (5 mg) by mouth 3 times a day as needed for constipation for up to 4 days. Do not crush, chew, or split.  Dysuria  -     POCT UA Automated manually resulted  -     POCT pregnancy, urine manually resulted        Disposition:  Home      Divina New PA-C     This medical note was created with the assistance of artificial intelligence (AI) for documentation purposes. The content has been reviewed and confirmed by the healthcare provider for accuracy and completeness. Patient consented to the use of audio recording and use of AI during their visit.

## 2025-07-28 ENCOUNTER — OFFICE VISIT (OUTPATIENT)
Dept: URGENT CARE | Age: 45
End: 2025-07-28
Payer: COMMERCIAL

## 2025-07-28 ENCOUNTER — HOSPITAL ENCOUNTER (EMERGENCY)
Facility: HOSPITAL | Age: 45
Discharge: HOME | End: 2025-07-28
Attending: STUDENT IN AN ORGANIZED HEALTH CARE EDUCATION/TRAINING PROGRAM
Payer: COMMERCIAL

## 2025-07-28 ENCOUNTER — APPOINTMENT (OUTPATIENT)
Dept: RADIOLOGY | Facility: HOSPITAL | Age: 45
End: 2025-07-28
Payer: COMMERCIAL

## 2025-07-28 VITALS
BODY MASS INDEX: 18.48 KG/M2 | RESPIRATION RATE: 16 BRPM | HEART RATE: 71 BPM | OXYGEN SATURATION: 100 % | HEIGHT: 66 IN | WEIGHT: 115 LBS | TEMPERATURE: 98 F | DIASTOLIC BLOOD PRESSURE: 82 MMHG | SYSTOLIC BLOOD PRESSURE: 151 MMHG

## 2025-07-28 VITALS
BODY MASS INDEX: 18.48 KG/M2 | HEIGHT: 66 IN | DIASTOLIC BLOOD PRESSURE: 87 MMHG | HEART RATE: 65 BPM | SYSTOLIC BLOOD PRESSURE: 140 MMHG | OXYGEN SATURATION: 99 % | WEIGHT: 115 LBS | TEMPERATURE: 98.1 F | RESPIRATION RATE: 18 BRPM

## 2025-07-28 DIAGNOSIS — R40.20 LOSS OF CONSCIOUSNESS (MULTI): Primary | ICD-10-CM

## 2025-07-28 DIAGNOSIS — H53.9 VISUAL DISTURBANCES: ICD-10-CM

## 2025-07-28 DIAGNOSIS — R53.1 GENERALIZED WEAKNESS: ICD-10-CM

## 2025-07-28 DIAGNOSIS — R55 SYNCOPE AND COLLAPSE: Primary | ICD-10-CM

## 2025-07-28 LAB
ALBUMIN SERPL BCP-MCNC: 4.5 G/DL (ref 3.4–5)
ALP SERPL-CCNC: 47 U/L (ref 33–110)
ALT SERPL W P-5'-P-CCNC: 11 U/L (ref 7–45)
ANION GAP SERPL CALCULATED.3IONS-SCNC: 8 MMOL/L (ref 10–20)
AST SERPL W P-5'-P-CCNC: 14 U/L (ref 9–39)
BASOPHILS # BLD AUTO: 0.04 X10*3/UL (ref 0–0.1)
BASOPHILS NFR BLD AUTO: 1.1 %
BILIRUB SERPL-MCNC: 0.6 MG/DL (ref 0–1.2)
BUN SERPL-MCNC: 10 MG/DL (ref 6–23)
CALCIUM SERPL-MCNC: 9.1 MG/DL (ref 8.6–10.3)
CARDIAC TROPONIN I PNL SERPL HS: <3 NG/L (ref 0–13)
CHLORIDE SERPL-SCNC: 107 MMOL/L (ref 98–107)
CO2 SERPL-SCNC: 28 MMOL/L (ref 21–32)
CREAT SERPL-MCNC: 0.57 MG/DL (ref 0.5–1.05)
D DIMER PPP FEU-MCNC: <0.19 MG/L FEU (ref 0.19–0.5)
EGFRCR SERPLBLD CKD-EPI 2021: >90 ML/MIN/1.73M*2
EOSINOPHIL # BLD AUTO: 0.08 X10*3/UL (ref 0–0.7)
EOSINOPHIL NFR BLD AUTO: 2.1 %
ERYTHROCYTE [DISTWIDTH] IN BLOOD BY AUTOMATED COUNT: 12.1 % (ref 11.5–14.5)
GLUCOSE SERPL-MCNC: 108 MG/DL (ref 74–99)
HCT VFR BLD AUTO: 39.7 % (ref 36–46)
HGB BLD-MCNC: 13.4 G/DL (ref 12–16)
IMM GRANULOCYTES # BLD AUTO: 0.02 X10*3/UL (ref 0–0.7)
IMM GRANULOCYTES NFR BLD AUTO: 0.5 % (ref 0–0.9)
LYMPHOCYTES # BLD AUTO: 1.34 X10*3/UL (ref 1.2–4.8)
LYMPHOCYTES NFR BLD AUTO: 35.8 %
MAGNESIUM SERPL-MCNC: 2.21 MG/DL (ref 1.6–2.4)
MCH RBC QN AUTO: 30 PG (ref 26–34)
MCHC RBC AUTO-ENTMCNC: 33.8 G/DL (ref 32–36)
MCV RBC AUTO: 89 FL (ref 80–100)
MONOCYTES # BLD AUTO: 0.32 X10*3/UL (ref 0.1–1)
MONOCYTES NFR BLD AUTO: 8.6 %
NEUTROPHILS # BLD AUTO: 1.94 X10*3/UL (ref 1.2–7.7)
NEUTROPHILS NFR BLD AUTO: 51.9 %
NRBC BLD-RTO: 0 /100 WBCS (ref 0–0)
PLATELET # BLD AUTO: 190 X10*3/UL (ref 150–450)
POC FINGERSTICK BLOOD GLUCOSE: 101 MG/DL (ref 70–100)
POTASSIUM SERPL-SCNC: 4.1 MMOL/L (ref 3.5–5.3)
PROT SERPL-MCNC: 6.8 G/DL (ref 6.4–8.2)
RBC # BLD AUTO: 4.46 X10*6/UL (ref 4–5.2)
SODIUM SERPL-SCNC: 139 MMOL/L (ref 136–145)
T4 FREE SERPL-MCNC: 1.3 NG/DL (ref 0.61–1.12)
TSH SERPL-ACNC: 0.13 MIU/L (ref 0.44–3.98)
WBC # BLD AUTO: 3.7 X10*3/UL (ref 4.4–11.3)

## 2025-07-28 PROCEDURE — 1036F TOBACCO NON-USER: CPT | Performed by: NURSE PRACTITIONER

## 2025-07-28 PROCEDURE — 85025 COMPLETE CBC W/AUTO DIFF WBC: CPT | Performed by: STUDENT IN AN ORGANIZED HEALTH CARE EDUCATION/TRAINING PROGRAM

## 2025-07-28 PROCEDURE — 84075 ASSAY ALKALINE PHOSPHATASE: CPT | Performed by: STUDENT IN AN ORGANIZED HEALTH CARE EDUCATION/TRAINING PROGRAM

## 2025-07-28 PROCEDURE — 83735 ASSAY OF MAGNESIUM: CPT | Performed by: STUDENT IN AN ORGANIZED HEALTH CARE EDUCATION/TRAINING PROGRAM

## 2025-07-28 PROCEDURE — 93000 ELECTROCARDIOGRAM COMPLETE: CPT | Performed by: NURSE PRACTITIONER

## 2025-07-28 PROCEDURE — 96360 HYDRATION IV INFUSION INIT: CPT

## 2025-07-28 PROCEDURE — 99215 OFFICE O/P EST HI 40 MIN: CPT | Performed by: NURSE PRACTITIONER

## 2025-07-28 PROCEDURE — 84484 ASSAY OF TROPONIN QUANT: CPT | Performed by: STUDENT IN AN ORGANIZED HEALTH CARE EDUCATION/TRAINING PROGRAM

## 2025-07-28 PROCEDURE — 70450 CT HEAD/BRAIN W/O DYE: CPT

## 2025-07-28 PROCEDURE — 82962 GLUCOSE BLOOD TEST: CPT | Performed by: NURSE PRACTITIONER

## 2025-07-28 PROCEDURE — 99284 EMERGENCY DEPT VISIT MOD MDM: CPT | Mod: 25 | Performed by: STUDENT IN AN ORGANIZED HEALTH CARE EDUCATION/TRAINING PROGRAM

## 2025-07-28 PROCEDURE — 84443 ASSAY THYROID STIM HORMONE: CPT | Performed by: STUDENT IN AN ORGANIZED HEALTH CARE EDUCATION/TRAINING PROGRAM

## 2025-07-28 PROCEDURE — 36415 COLL VENOUS BLD VENIPUNCTURE: CPT | Performed by: STUDENT IN AN ORGANIZED HEALTH CARE EDUCATION/TRAINING PROGRAM

## 2025-07-28 PROCEDURE — 85300 ANTITHROMBIN III ACTIVITY: CPT | Performed by: STUDENT IN AN ORGANIZED HEALTH CARE EDUCATION/TRAINING PROGRAM

## 2025-07-28 PROCEDURE — 84439 ASSAY OF FREE THYROXINE: CPT | Performed by: STUDENT IN AN ORGANIZED HEALTH CARE EDUCATION/TRAINING PROGRAM

## 2025-07-28 PROCEDURE — 2500000004 HC RX 250 GENERAL PHARMACY W/ HCPCS (ALT 636 FOR OP/ED): Performed by: STUDENT IN AN ORGANIZED HEALTH CARE EDUCATION/TRAINING PROGRAM

## 2025-07-28 PROCEDURE — 3008F BODY MASS INDEX DOCD: CPT | Performed by: NURSE PRACTITIONER

## 2025-07-28 PROCEDURE — 70450 CT HEAD/BRAIN W/O DYE: CPT | Performed by: STUDENT IN AN ORGANIZED HEALTH CARE EDUCATION/TRAINING PROGRAM

## 2025-07-28 RX ADMIN — SODIUM CHLORIDE 1000 ML: 900 INJECTION, SOLUTION INTRAVENOUS at 10:58

## 2025-07-28 ASSESSMENT — ENCOUNTER SYMPTOMS
NUMBNESS: 0
DIZZINESS: 0
LIGHT-HEADEDNESS: 0
WEAKNESS: 1
NERVOUS/ANXIOUS: 0
HEADACHES: 1
SPEECH DIFFICULTY: 0

## 2025-07-28 ASSESSMENT — PAIN - FUNCTIONAL ASSESSMENT: PAIN_FUNCTIONAL_ASSESSMENT: 0-10

## 2025-07-28 ASSESSMENT — PAIN SCALES - GENERAL
PAINLEVEL_OUTOF10: 0-NO PAIN
PAINLEVEL_OUTOF10: 0 - NO PAIN
PAINLEVEL_OUTOF10: 0 - NO PAIN

## 2025-07-28 NOTE — PROGRESS NOTES
Subjective   Patient ID: Catalina Meyers is a 45 y.o. female. They present today with a chief complaint of Loss of Consciousness (Patient states on Wednesday she was out to dinner when she had two episodes of syncope/ loss of consciousness. Still feel weak, like she is out of it like her head is in space but her body isn't. ).    History of Present Illness  Patient 45-year-old female presents with complaining of 2 syncopal episodes last week.  Patient reports eating dinner and having little bit of a drink with some symptom onset of tunnel vision, muffled hearing, generalized weakness followed by syncopal episodes that was witnessed.  Per witness, episode lasted about 20 seconds.  Upon awakening patient was mildly confused leading to a neck syncopal episode shortly after lasting about 20 seconds as well.  Precipitating symptoms were exactly the same as the first episode.  Patient did not proceed to be evaluated by the healthcare professional at that time.  Patient reports feeling unwell since the event with generalized weakness, blurry vision, and brain fogginess.  Patient denies pre-existing cardiovascular condition.  Patient denies previous syncopal episode.  She reports headaches as a baseline.  She denies speech difficulties numbness or tingling to the extremities.  She denies lightheadedness or dizziness.  She denies chest pain or shortness of breath.    Past Medical History  Allergies as of 07/28/2025 - Reviewed 07/28/2025   Allergen Reaction Noted    Cephalexin Hives, Rash, and Shortness of breath 01/19/2024    Sulfamethoxazole-trimethoprim Hives, Rash, and Shortness of breath 01/19/2024       Prescriptions Prior to Admission[1]     Medical History[2]    Surgical History[3]     reports that she has never smoked. She has never used smokeless tobacco. She reports that she does not currently use alcohol. She reports current drug use. Frequency: 3.00 times per week. Drug: Marijuana.    Review of Systems  Review of  "Systems   Neurological:  Positive for weakness and headaches. Negative for dizziness, speech difficulty, light-headedness and numbness.   Psychiatric/Behavioral:  The patient is not nervous/anxious.                                   Objective    Vitals:    07/28/25 0845   BP: 151/82   BP Location: Right arm   Patient Position: Sitting   Pulse: 71   Resp: 16   Temp: 36.7 °C (98 °F)   TempSrc: Oral   SpO2: 100%   Weight: 52.2 kg (115 lb)   Height: 1.676 m (5' 6\")     No LMP recorded. Patient has had a hysterectomy.    Physical Exam  Vitals reviewed.   General: Vitals Noted. No distress. Normocephalic.  Cardiovascular:     Heart sounds: Normal heart sounds, S1 normal and S2 normal. No murmur heard.     No friction rub.   Pulmonary:      Effort: Pulmonary effort is normal.      Breath sounds:  Lungs clear to auscultation bilaterally   HEENT: Left and right TM is within normal limits. No drainage.  EOMI, normal conjunctiva, patent nares, Normal OP No maxillary and frontal sinus tenderness on palpation is present. Pharynx and tonsils are not hyperemic, and without exudate.   Neck: Supple with no adenopathy.  Lymphadenopathy:   No cervical adenopathy on palpation  Lower Body: No right inguinal adenopathy. No left inguinal adenopathy.   Abdominal:      Palpations: There is no hepatomegaly, splenomegaly or mass. Abdomen is soft, non-tender, and non-distended. No suprapubic tenderness. No CVA tenderness.   Skin:     Comments: no rash   Neurological:      Cranial Nerves: Cranial nerves 2-12 are intact.      Sensory: No sensory deficit.      Motor: Motor function is intact.      Deep Tendon Reflexes: Reflexes are normal and symmetric.       Procedures    Point of Care Test & Imaging Results from this visit  Results for orders placed or performed in visit on 07/28/25   POCT fingerstick glucose manually resulted   Result Value Ref Range    POC Fingerstick Blood Glucose 101 (A) 70 - 100 mg/dl      Imaging  No results " found.    Cardiology, Vascular, and Other Imaging  No other imaging results found for the past 2 days      Diagnostic study results (if any) were reviewed by ALYSSA Ronquillo.    Assessment/Plan   Allergies, medications, history, and pertinent labs/EKGs/Imaging reviewed by ALYSSA Ronquillo.     Medical Decision Making  Patient is alert and oriented x 4 in no acute distress.  Presents with concern for loss of consciousness twice last.  On presentation examination vital signs are found with hypertension.  Clear lungs per auscultation.  No neuro logical deficit upon examination.  EKG completed in the office showing normal sinus rhythm with no AV block.  Blood sugar is 101.  Given patient's history and presentation with ongoing generalized weakness patient was referred to nearest emergency for further evaluation to rule out acute presentation associated with cardiovascular or neurological deficit.  [facility]  Patient does not have a  at this point.  AMA form refusing ambulance was signed today.  Dr. Chen was made aware and in agreement.    Orders and Diagnoses  Diagnoses and all orders for this visit:  Loss of consciousness (Multi)  -     ECG 12 Lead  -     POCT fingerstick glucose manually resulted  Generalized weakness  Visual disturbances      Medical Admin Record      Patient disposition: ER    Electronically signed by ALYSSA Ronquillo  9:40 AM           [1] (Not in a hospital admission)   [2]   Past Medical History:  Diagnosis Date    Anxiety     Depression     Disease of thyroid gland    [3]   Past Surgical History:  Procedure Laterality Date    HYSTERECTOMY  2005    MR HEAD ANGIO WO IV CONTRAST  06/28/2022    MR HEAD ANGIO WO IV CONTRAST [facility]

## 2025-07-28 NOTE — ED TRIAGE NOTES
Patient presents to ED from home for syncope on Wednesday and has not felt right ever since. Patient states her legs feel weak and she is tired.

## 2025-07-28 NOTE — ED PROVIDER NOTES
Emergency Department Provider Note              [facility] EMERGENCY MEDICINE  Emergency Department        Pt Name: Catalina Meyers  MRN: [id number]  Birthdate 1980  Date of evaluation: [unfilled]    CHIEF COMPLAINT       Chief Complaint   Patient presents with    Syncope       OF PRESENT ILLNESS  (Location/Symptom, Timing/Onset, Context/Setting, Quality, Duration, ModifyingFactors, Severity.)      Catalina Meyers is a 45 y.o. female who presents with syncopal event.  This happened on Wednesday today is Monday she reports being out of the restaurant for dinner she had eaten her meal which consisted of sauerkraut and Kielbasa states that she was sipping on a Coke and an alcoholic beverage when she felt hot flushed blurred vision nauseated and she had approximately 20 seconds loss of consciousness according to her significant other she woke up felt nauseated and had another syncopal episode since then she describes feeling weak and tired slight headache 99.1 temperature slightly short of breath using her inhaler more    PAST MEDICAL / SURGICAL / SOCIAL / FAMILY HISTORY      has a past medical history of Anxiety, Depression, and Disease of thyroid gland.    She has no past medical history of Personal history of irradiation.     has a past surgical history that includes MR angio head wo IV contrast (06/28/2022) and Hysterectomy (2005).    Social History     Socioeconomic History    Marital status:      Spouse name: Not on file    Number of children: Not on file    Years of education: Not on file    Highest education level: Not on file   Occupational History    Not on file   Tobacco Use    Smoking status: Never    Smokeless tobacco: Never    Tobacco comments:     Vape   Vaping Use    Vaping status: Every Day    Substances: Nicotine, Flavoring    Devices: Disposable   Substance and Sexual Activity    Alcohol use: Not Currently    Drug use: Yes     Frequency: 3.0 times per week     Types: Marijuana  "    Comment: Weed    Sexual activity: Not Currently     Partners: Male   Other Topics Concern    Not on file   Social History Narrative    Not on file     Social Drivers of Health     Financial Resource Strain: Not on file   Food Insecurity: Not on file   Transportation Needs: Not on file   Physical Activity: Not on file   Stress: Not on file   Social Connections: Not on file   Intimate Partner Violence: Not on file   Housing Stability: Not on file       Family History[1]    Allergies:  Cephalexin and Sulfamethoxazole-trimethoprim    Home Medications:  Prior to Admission medications   Medication Sig Start Date End Date Taking? Authorizing Provider   albuterol 108 (90 Base) MCG/ACT inhaler Inhale 2 puffs every 4 hours.    Historical Provider, MD   BreatheRite MDI Spacer inhaler 1 device one time only for 1 dose. 4/29/25   Historical Provider, MD   hydrOXYzine HCL (Atarax) 25 mg tablet Take by mouth every 8 hours.    Historical Provider, MD   levothyroxine (Synthroid, Levoxyl) 125 mcg tablet Take 1 tablet (125 mcg) by mouth once daily in the morning. Take before meals. Take on an empty stomach. 3/4/25   Noreen Arita MD   loratadine (Claritin) 10 mg tablet Take 1 tablet (10 mg) by mouth once daily. 4/29/25 5/29/25  Historical Provider, MD   ondansetron ODT (Zofran-ODT) 4 mg disintegrating tablet Dissolve 1 tablet (4 mg) in the mouth every 6 hours if needed for nausea or vomiting. 4/29/25   Historical Provider, MD       REVIEW OF SYSTEMS    (2-9 systems for level 4, 10 or more for level 5)     Review of Systems    PHYSICAL EXAM   (up to 7 for level 4, 8 or more for level 5)     INITIAL VITALS:   /77   Pulse 60   Temp 36.7 °C (98.1 °F) (Temporal)   Resp 18   Ht 1.676 m (5' 6\")   Wt 52.2 kg (115 lb)   SpO2 98%   BMI 18.56 kg/m²     Physical Exam    PHYSICAL EXAM    General: Well Appearing, non toxic, speaking in full sentences  Skin: dry, no rash  Head: Normocephalic/ atraumatic  Neck: Supple  Eye: EOMI, " normal conjunctiva  ENT: Moist oral mucosa, nares patent  CVS: RRR, 2+ pulses radial  Pulm: Non labored  Back: Normal ROM  MSK: strength symmetrical upper and lower limbs  GI: non distended  Neuro: A&O x 4, no focal neuro deficits, reflexes tested lower limbs and normal, no hyper or hypo spasticity, power is reduced in lower legs to gravity 4/5, upper arms 5/5 power and finger strength, speech normal, no paraesthesias   Pysch: Appropriate affect       DIAGNOSIS         (LABS / IMAGING / EKG):  Orders Placed This Encounter   Procedures    CT head wo IV contrast    CBC and Auto Differential    Comprehensive metabolic panel    Magnesium    TSH with reflex to Free T4 if abnormal    Troponin I, High Sensitivity    D-dimer, quantitative    Thyroxine, Free       MEDICATIONS ORDERED:  [unfilled]    RESULTS / EMERGENCY DEPARTMENT COURSE / MDM   :  Results for orders placed or performed during the hospital encounter of 07/28/25   CBC and Auto Differential    Collection Time: 07/28/25 10:55 AM   Result Value Ref Range    WBC 3.7 (L) 4.4 - 11.3 x10*3/uL    nRBC 0.0 0.0 - 0.0 /100 WBCs    RBC 4.46 4.00 - 5.20 x10*6/uL    Hemoglobin 13.4 12.0 - 16.0 g/dL    Hematocrit 39.7 36.0 - 46.0 %    MCV 89 80 - 100 fL    MCH 30.0 26.0 - 34.0 pg    MCHC 33.8 32.0 - 36.0 g/dL    RDW 12.1 11.5 - 14.5 %    Platelets 190 150 - 450 x10*3/uL    Neutrophils % 51.9 40.0 - 80.0 %    Immature Granulocytes %, Automated 0.5 0.0 - 0.9 %    Lymphocytes % 35.8 13.0 - 44.0 %    Monocytes % 8.6 2.0 - 10.0 %    Eosinophils % 2.1 0.0 - 6.0 %    Basophils % 1.1 0.0 - 2.0 %    Neutrophils Absolute 1.94 1.20 - 7.70 x10*3/uL    Immature Granulocytes Absolute, Automated 0.02 0.00 - 0.70 x10*3/uL    Lymphocytes Absolute 1.34 1.20 - 4.80 x10*3/uL    Monocytes Absolute 0.32 0.10 - 1.00 x10*3/uL    Eosinophils Absolute 0.08 0.00 - 0.70 x10*3/uL    Basophils Absolute 0.04 0.00 - 0.10 x10*3/uL   Comprehensive metabolic panel    Collection Time: 07/28/25 10:55 AM   Result  Value Ref Range    Glucose 108 (H) 74 - 99 mg/dL    Sodium 139 136 - 145 mmol/L    Potassium 4.1 3.5 - 5.3 mmol/L    Chloride 107 98 - 107 mmol/L    Bicarbonate 28 21 - 32 mmol/L    Anion Gap 8 (L) 10 - 20 mmol/L    Urea Nitrogen 10 6 - 23 mg/dL    Creatinine 0.57 0.50 - 1.05 mg/dL    eGFR >90 >60 mL/min/1.73m*2    Calcium 9.1 8.6 - 10.3 mg/dL    Albumin 4.5 3.4 - 5.0 g/dL    Alkaline Phosphatase 47 33 - 110 U/L    Total Protein 6.8 6.4 - 8.2 g/dL    AST 14 9 - 39 U/L    Bilirubin, Total 0.6 0.0 - 1.2 mg/dL    ALT 11 7 - 45 U/L   Magnesium    Collection Time: 07/28/25 10:55 AM   Result Value Ref Range    Magnesium 2.21 1.60 - 2.40 mg/dL   TSH with reflex to Free T4 if abnormal    Collection Time: 07/28/25 10:55 AM   Result Value Ref Range    Thyroid Stimulating Hormone 0.13 (L) 0.44 - 3.98 mIU/L   Troponin I, High Sensitivity    Collection Time: 07/28/25 10:55 AM   Result Value Ref Range    Troponin I, High Sensitivity <3 0 - 13 ng/L   Thyroxine, Free    Collection Time: 07/28/25 10:55 AM   Result Value Ref Range    Thyroxine, Free 1.30 (H) 0.61 - 1.12 ng/dL   D-dimer, quantitative    Collection Time: 07/28/25 10:56 AM   Result Value Ref Range    D-Dimer Non VTE, Quant (mg/L FEU) <0.19 (L) 0.19 - 0.50 mg/L FEU       IMPRESSION:     RADIOLOGY:      EKG:      POC ULTRASOUND:      EMERGENCY DEPARTMENT COURSE:  45-year-old female generalized weakness evaluation  Syncopal event last week continues to feel weak  Her exam shows 5 out of 5 power in upper extremities slightly decreased 4-5 power in the lower extremities bilateral  For this reason a CT head was ordered to look for any signs of a stroke or something similar to this this is what the patient is worried about  The CT scan does not show any signs of acute intracranial hemorrhage or encephalomalacia  Her labs are pristine and that there is no signs of a leukocytosis that would explain an infection in fact she is slightly leukopenic TSH is low but her free T4 is  elevated glucose is slightly above normal but not concerning kidney function is normal  An EKG was obtained to look for any sort of cardiac dysfunction or dysrhythmia  Her EKG is interpreted by myself is incomplete right bundle branch block good R wave progression ventricular rate 71 QTc 443 normal axis no signs of ischemia  With negative workup including imaging and blood work today she can be discharged with follow-up to PCP  If worsening lower motor disturbance and or any problems with breathing, would consider possibly a neuro muscular junction abnormalities such as GBS but for now, she does not elicit striking symptoms to warrant admission nor LP at this time    Diagnoses as of 07/28/25 1220   Syncope and collapse       PROCEDURES:      CONSULTS:  None    CRITICAL CARE:      FINAL IMPRESSION      1. Syncope and collapse          DISPOSITION / PLAN     [unfilled]    PATIENT REFERRED TO:  Noreen Arita MD  [id number] [facility] [id number] [phone]    In 1 week        DISCHARGE MEDICATIONS:  New Prescriptions    No medications on file       Travis Partida MD  12:20 PM    Attending Emergency Physician  [facility] EMERGENCY MEDICINE    (Please note that portions of this note were completed with a voice recognition program.  Effortswere made to edit the dictations but occasionally words are mis-transcribed.)                                                                 [1]   Family History  Problem Relation Name Age of Onset    Breast cancer Mother Shraddha Martinez     Breast cancer Maternal Grandmother Jayy montalvo         Travis Partida MD  07/28/25 2213

## 2025-08-11 ENCOUNTER — OFFICE VISIT (OUTPATIENT)
Dept: PRIMARY CARE | Facility: CLINIC | Age: 45
End: 2025-08-11
Payer: COMMERCIAL

## 2025-08-11 VITALS
DIASTOLIC BLOOD PRESSURE: 82 MMHG | BODY MASS INDEX: 18.88 KG/M2 | TEMPERATURE: 97.5 F | HEART RATE: 67 BPM | OXYGEN SATURATION: 99 % | WEIGHT: 117 LBS | SYSTOLIC BLOOD PRESSURE: 132 MMHG

## 2025-08-11 DIAGNOSIS — R55 VASOVAGAL EPISODE: Primary | ICD-10-CM

## 2025-08-11 DIAGNOSIS — E03.9 HYPOTHYROIDISM, UNSPECIFIED TYPE: ICD-10-CM

## 2025-08-11 PROCEDURE — 99214 OFFICE O/P EST MOD 30 MIN: CPT | Performed by: INTERNAL MEDICINE

## 2025-08-11 RX ORDER — LEVOTHYROXINE SODIUM 112 UG/1
112 TABLET ORAL DAILY
Qty: 90 TABLET | Refills: 3 | Status: SHIPPED | OUTPATIENT
Start: 2025-08-11 | End: 2026-08-11

## 2025-08-11 ASSESSMENT — PAIN SCALES - GENERAL: PAINLEVEL_OUTOF10: 0-NO PAIN

## 2025-08-12 ASSESSMENT — ENCOUNTER SYMPTOMS
POLYDIPSIA: 0
SHORTNESS OF BREATH: 0
FEVER: 0
FATIGUE: 0
DIAPHORESIS: 0
POLYPHAGIA: 0
CHILLS: 0
PALPITATIONS: 0
ACTIVITY CHANGE: 0
ROS GI COMMENTS: SEE HPI
APPETITE CHANGE: 0